# Patient Record
Sex: MALE | Race: WHITE
[De-identification: names, ages, dates, MRNs, and addresses within clinical notes are randomized per-mention and may not be internally consistent; named-entity substitution may affect disease eponyms.]

---

## 2017-03-31 ENCOUNTER — HOSPITAL ENCOUNTER
Dept: HOSPITAL 49 - FER | Age: 82
LOS: 1 days | Discharge: HOME | End: 2017-04-01
Admitting: HOSPITALIST
Payer: MEDICARE

## 2017-03-31 DIAGNOSIS — I12.9: ICD-10-CM

## 2017-03-31 DIAGNOSIS — J84.10: ICD-10-CM

## 2017-03-31 DIAGNOSIS — Z79.899: ICD-10-CM

## 2017-03-31 DIAGNOSIS — K21.9: ICD-10-CM

## 2017-03-31 DIAGNOSIS — N18.9: ICD-10-CM

## 2017-03-31 DIAGNOSIS — E78.5: ICD-10-CM

## 2017-03-31 DIAGNOSIS — R07.9: Primary | ICD-10-CM

## 2017-03-31 DIAGNOSIS — Z87.891: ICD-10-CM

## 2017-03-31 DIAGNOSIS — Z79.82: ICD-10-CM

## 2017-03-31 DIAGNOSIS — Z98.42: ICD-10-CM

## 2017-03-31 DIAGNOSIS — Z98.41: ICD-10-CM

## 2017-03-31 DIAGNOSIS — Z98.890: ICD-10-CM

## 2017-03-31 DIAGNOSIS — I25.10: ICD-10-CM

## 2017-03-31 LAB
ALBUMIN SERPL-MCNC: 4.3 G/DL (ref 3.4–4.8)
ALKALINE PHOSHATASE: 67 U/L (ref 59–141)
ALT SERPL-CCNC: 10 U/L (ref 2–40)
AST: 16 U/L (ref 0–37)
BASOPHIL: 0.2 % (ref 0–2)
BILIRUBIN - TOTAL: 0.5 MG/DL (ref 0.1–1)
BUN SERPL-MCNC: 22 MG/DL (ref 6–25)
BUN/CREAT RATIO (CALC): 12 (ref 12.1–20.1)
CHLORIDE: 97 MMOL/L (ref 98–107)
CK MB SERPL-MCNC: 1 NG/ML (ref 0.97–4.94)
CK MB SERPL-MCNC: 1 NG/ML (ref 0.97–4.94)
CK MB SERPL-MCNC: 1.03 NG/ML (ref 0.97–4.94)
CK SERPL-CCNC: 77 U/L (ref 38–174)
CO2 (BICARBONATE): 27 MMOL/L (ref 23–33)
CREATININE: 1.8 MG/DL (ref 0.7–1.2)
D DIMER PPP FEU-MCNC: 3.4 UG/MLFEU (ref 0–0.41)
EOSINOPHIL: 2.4 % (ref 0–7)
GLOBULIN (CALCULATION): 3.5 G/DL (ref 2.2–4.2)
GLUCOSE SERPL-MCNC: 110 MG/DL (ref 83–110)
HCT: 38.1 % (ref 42–52)
HGB BLD-MCNC: 12.7 G/DL (ref 13.2–18)
INR PPP: 1.03 (ref 0.9–1.2)
LIPASE: 51 U/L (ref 13–60)
LYMPHOCYTE: 24.4 % (ref 15–48)
MAGNESIUM SERPL-MCNC: 1.91 MG/DL (ref 1.4–2.1)
MCH RBC QN AUTO: 30.6 PG (ref 25–31)
MCHC RBC AUTO-ENTMCNC: 33.3 G/DL (ref 32–36)
MCV: 91.8 FL (ref 78–100)
MONOCYTE: 11.7 % (ref 0–12)
MPV: 8.8 FL (ref 6–9.5)
MYOGLOBIN: 44 NG/ML (ref 26–65)
NEUTROPHIL: 61.3 % (ref 41–80)
PLT: 258 K/UL (ref 150–400)
POTASSIUM: 4.1 MMOL/L (ref 3.5–5.1)
PRO-BNP: 383 PG/ML (ref 0–450)
PROTHROMBIN TIME: 13.1 SECONDS (ref 11.7–14)
PTT: 29.6 SECONDS (ref 23.2–31.4)
RBC MORPHOLOGY: NORMAL
RBC: 4.15 M/UL (ref 4.7–6)
RDW: 13.7 % (ref 11.5–14)
TOTAL PROTEIN: 7.8 G/DL (ref 6.4–8.3)
TROPONIN T: < 0.01 NG/ML
WBC: 9.9 K/UL (ref 4–10.5)

## 2017-03-31 PROCEDURE — A9539 TC99M PENTETATE: HCPCS

## 2017-03-31 PROCEDURE — A9540 TC99M MAA: HCPCS

## 2017-03-31 PROCEDURE — G0378 HOSPITAL OBSERVATION PER HR: HCPCS

## 2017-04-05 ENCOUNTER — HOSPITAL ENCOUNTER (EMERGENCY)
Dept: HOSPITAL 49 - FER | Age: 82
LOS: 1 days | Discharge: HOME | End: 2017-04-06
Payer: MEDICARE

## 2017-04-05 DIAGNOSIS — I11.9: ICD-10-CM

## 2017-04-05 DIAGNOSIS — I44.4: ICD-10-CM

## 2017-04-05 DIAGNOSIS — Z98.61: ICD-10-CM

## 2017-04-05 DIAGNOSIS — E78.5: ICD-10-CM

## 2017-04-05 DIAGNOSIS — Z79.899: ICD-10-CM

## 2017-04-05 DIAGNOSIS — R07.89: Primary | ICD-10-CM

## 2017-04-05 DIAGNOSIS — I25.10: ICD-10-CM

## 2017-04-05 DIAGNOSIS — K21.9: ICD-10-CM

## 2017-04-05 DIAGNOSIS — R06.02: ICD-10-CM

## 2017-04-05 LAB
ALBUMIN SERPL-MCNC: 4.2 G/DL (ref 3.4–4.8)
ALKALINE PHOSHATASE: 71 U/L (ref 59–141)
ALT SERPL-CCNC: 11 U/L (ref 2–40)
AST: 17 U/L (ref 0–37)
BASOPHIL: 0.3 % (ref 0–2)
BILIRUBIN - TOTAL: 0.4 MG/DL (ref 0.1–1)
BUN SERPL-MCNC: 26 MG/DL (ref 6–25)
BUN/CREAT RATIO (CALC): 13 (ref 12.1–20.1)
CHLORIDE: 94 MMOL/L (ref 98–107)
CK MB SERPL-MCNC: 1.53 NG/ML (ref 0.97–4.94)
CK SERPL-CCNC: 93 U/L (ref 38–174)
CO2 (BICARBONATE): 25 MMOL/L (ref 23–33)
CREATININE: 2 MG/DL (ref 0.7–1.2)
EOSINOPHIL: 1.1 % (ref 0–7)
GLOBULIN (CALCULATION): 4.3 G/DL (ref 2.2–4.2)
GLUCOSE SERPL-MCNC: 125 MG/DL (ref 83–110)
HCT: 37.2 % (ref 42–52)
HGB BLD-MCNC: 12.8 G/DL (ref 13.2–18)
INR PPP: 1.03 (ref 0.9–1.2)
LYMPHOCYTE: 26.2 % (ref 15–48)
MAGNESIUM SERPL-MCNC: 1.87 MG/DL (ref 1.4–2.1)
MCH RBC QN AUTO: 31.5 PG (ref 25–31)
MCHC RBC AUTO-ENTMCNC: 34.4 G/DL (ref 32–36)
MCV: 91.6 FL (ref 78–100)
MONOCYTE: 10.8 % (ref 0–12)
MPV: 9.1 FL (ref 6–9.5)
MYOGLOBIN: 99 NG/ML (ref 26–65)
NEUTROPHIL: 61.6 % (ref 41–80)
PLT: 284 K/UL (ref 150–400)
POTASSIUM: 4.4 MMOL/L (ref 3.5–5.1)
PRO-BNP: 330 PG/ML (ref 0–450)
PROTHROMBIN TIME: 13.1 SECONDS (ref 11.7–14)
PTT: 29.5 SECONDS (ref 23.2–31.4)
RBC MORPHOLOGY: NORMAL
RBC: 4.06 M/UL (ref 4.7–6)
RDW: 13.4 % (ref 11.5–14)
TOTAL PROTEIN: 8.5 G/DL (ref 6.4–8.3)
TROPONIN T: < 0.01 NG/ML
WBC: 12 K/UL (ref 4–10.5)

## 2017-04-06 LAB
CK MB SERPL-MCNC: 1.01 NG/ML (ref 0.97–4.94)
TROPONIN T: < 0.01 NG/ML

## 2021-10-17 ENCOUNTER — APPOINTMENT (OUTPATIENT)
Dept: CT IMAGING | Facility: HOSPITAL | Age: 86
End: 2021-10-17

## 2021-10-17 ENCOUNTER — APPOINTMENT (OUTPATIENT)
Dept: GENERAL RADIOLOGY | Facility: HOSPITAL | Age: 86
End: 2021-10-17

## 2021-10-17 ENCOUNTER — HOSPITAL ENCOUNTER (EMERGENCY)
Facility: HOSPITAL | Age: 86
Discharge: NURSING, REHAB OR LONG-TERM CARE | End: 2021-10-17
Attending: EMERGENCY MEDICINE | Admitting: EMERGENCY MEDICINE

## 2021-10-17 VITALS
BODY MASS INDEX: 26.96 KG/M2 | HEART RATE: 74 BPM | DIASTOLIC BLOOD PRESSURE: 80 MMHG | OXYGEN SATURATION: 96 % | WEIGHT: 167.77 LBS | HEIGHT: 66 IN | TEMPERATURE: 98.6 F | RESPIRATION RATE: 14 BRPM | SYSTOLIC BLOOD PRESSURE: 134 MMHG
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
## 2021-10-17 DIAGNOSIS — S01.311A LACERATION OF RIGHT EAR, INITIAL ENCOUNTER: ICD-10-CM

## 2021-10-17 DIAGNOSIS — S41.101A: ICD-10-CM

## 2021-10-17 LAB
ALBUMIN SERPL-MCNC: 3.5 G/DL (ref 3.5–5.2)
ALBUMIN/GLOB SERPL: 0.8 G/DL
ALP SERPL-CCNC: 80 U/L (ref 39–117)
ALT SERPL W P-5'-P-CCNC: 12 U/L (ref 1–41)
ANION GAP SERPL CALCULATED.3IONS-SCNC: 9.6 MMOL/L (ref 5–15)
AST SERPL-CCNC: 12 U/L (ref 1–40)
BASOPHILS # BLD AUTO: 0.04 10*3/MM3 (ref 0–0.2)
BASOPHILS NFR BLD AUTO: 0.4 % (ref 0–1.5)
BILIRUB SERPL-MCNC: 0.4 MG/DL (ref 0–1.2)
BILIRUB UR QL STRIP: NEGATIVE
BUN SERPL-MCNC: 32 MG/DL (ref 8–23)
BUN/CREAT SERPL: 16.4 (ref 7–25)
CALCIUM SPEC-SCNC: 9.5 MG/DL (ref 8.6–10.5)
CHLORIDE SERPL-SCNC: 102 MMOL/L (ref 98–107)
CLARITY UR: CLEAR
CO2 SERPL-SCNC: 23.4 MMOL/L (ref 22–29)
COLOR UR: YELLOW
CREAT SERPL-MCNC: 1.95 MG/DL (ref 0.76–1.27)
DEPRECATED RDW RBC AUTO: 43.3 FL (ref 37–54)
EOSINOPHIL # BLD AUTO: 0.15 10*3/MM3 (ref 0–0.4)
EOSINOPHIL NFR BLD AUTO: 1.3 % (ref 0.3–6.2)
ERYTHROCYTE [DISTWIDTH] IN BLOOD BY AUTOMATED COUNT: 12.5 % (ref 12.3–15.4)
GFR SERPL CREATININE-BSD FRML MDRD: 33 ML/MIN/1.73
GLOBULIN UR ELPH-MCNC: 4.3 GM/DL
GLUCOSE SERPL-MCNC: 113 MG/DL (ref 65–99)
GLUCOSE UR STRIP-MCNC: NEGATIVE MG/DL
HCT VFR BLD AUTO: 40.7 % (ref 37.5–51)
HGB BLD-MCNC: 13.6 G/DL (ref 13–17.7)
HGB UR QL STRIP.AUTO: NEGATIVE
IMM GRANULOCYTES # BLD AUTO: 0.04 10*3/MM3 (ref 0–0.05)
IMM GRANULOCYTES NFR BLD AUTO: 0.4 % (ref 0–0.5)
KETONES UR QL STRIP: NEGATIVE
LEUKOCYTE ESTERASE UR QL STRIP.AUTO: NEGATIVE
LYMPHOCYTES # BLD AUTO: 2.27 10*3/MM3 (ref 0.7–3.1)
LYMPHOCYTES NFR BLD AUTO: 20.4 % (ref 19.6–45.3)
MCH RBC QN AUTO: 31.9 PG (ref 26.6–33)
MCHC RBC AUTO-ENTMCNC: 33.4 G/DL (ref 31.5–35.7)
MCV RBC AUTO: 95.5 FL (ref 79–97)
MONOCYTES # BLD AUTO: 1.04 10*3/MM3 (ref 0.1–0.9)
MONOCYTES NFR BLD AUTO: 9.3 % (ref 5–12)
NEUTROPHILS NFR BLD AUTO: 68.2 % (ref 42.7–76)
NEUTROPHILS NFR BLD AUTO: 7.6 10*3/MM3 (ref 1.7–7)
NITRITE UR QL STRIP: NEGATIVE
NRBC BLD AUTO-RTO: 0 /100 WBC (ref 0–0.2)
PH UR STRIP.AUTO: 5.5 [PH] (ref 5–8)
PLATELET # BLD AUTO: 202 10*3/MM3 (ref 140–450)
PMV BLD AUTO: 10 FL (ref 6–12)
POTASSIUM SERPL-SCNC: 4.4 MMOL/L (ref 3.5–5.2)
PROT SERPL-MCNC: 7.8 G/DL (ref 6–8.5)
PROT UR QL STRIP: ABNORMAL
QT INTERVAL: 400 MS
RBC # BLD AUTO: 4.26 10*6/MM3 (ref 4.14–5.8)
SODIUM SERPL-SCNC: 135 MMOL/L (ref 136–145)
SP GR UR STRIP: 1.02 (ref 1–1.03)
TROPONIN T SERPL-MCNC: <0.01 NG/ML (ref 0–0.03)
UROBILINOGEN UR QL STRIP: ABNORMAL
WBC # BLD AUTO: 11.14 10*3/MM3 (ref 3.4–10.8)

## 2021-10-17 PROCEDURE — 70450 CT HEAD/BRAIN W/O DYE: CPT

## 2021-10-17 PROCEDURE — 80053 COMPREHEN METABOLIC PANEL: CPT | Performed by: NURSE PRACTITIONER

## 2021-10-17 PROCEDURE — 73060 X-RAY EXAM OF HUMERUS: CPT

## 2021-10-17 PROCEDURE — 93005 ELECTROCARDIOGRAM TRACING: CPT | Performed by: NURSE PRACTITIONER

## 2021-10-17 PROCEDURE — 99283 EMERGENCY DEPT VISIT LOW MDM: CPT

## 2021-10-17 PROCEDURE — 93010 ELECTROCARDIOGRAM REPORT: CPT | Performed by: INTERNAL MEDICINE

## 2021-10-17 PROCEDURE — 81003 URINALYSIS AUTO W/O SCOPE: CPT | Performed by: NURSE PRACTITIONER

## 2021-10-17 PROCEDURE — 72125 CT NECK SPINE W/O DYE: CPT

## 2021-10-17 PROCEDURE — 84484 ASSAY OF TROPONIN QUANT: CPT | Performed by: NURSE PRACTITIONER

## 2021-10-17 PROCEDURE — 85025 COMPLETE CBC W/AUTO DIFF WBC: CPT | Performed by: NURSE PRACTITIONER

## 2021-10-17 RX ORDER — GINSENG 100 MG
1 CAPSULE ORAL ONCE
Status: COMPLETED | OUTPATIENT
Start: 2021-10-17 | End: 2021-10-17

## 2021-10-17 RX ADMIN — Medication 1 APPLICATION: at 06:52

## 2021-10-17 NOTE — ED PROVIDER NOTES
Time: 06:16 EDT  Arrived by: EMS  Chief Complaint: Fall  History provided by: EMS and nursing home staff  History is limited by: Patient aphasic from CVA     History of Present Illness:  Patient is a 85 y.o. year old male that presents to the emergency department with history of fall per nursing home staff      History provided by:  Nursing home and EMS personnel  History limited by: Patient aphasic from CVA.  Fall  Mechanism of injury: fall    Injury location:  Head/neck  Head/neck injury location:  R ear  Incident location:  custodial  Time since incident:  30 minutes  Fall:     Fall occurred:  From a bed (Patient at the nursing home found on the floor by his bed)    Impact surface:  Carpet    Point of impact:  Unable to specify  Protective equipment: none    Suspicion of alcohol use: no    Suspicion of drug use: no    Tetanus status:  Up to date  Prior to arrival data:     Patient ambulatory at scene: no      Blood loss:  Minimal (Small laceration to right ear)    Responsiveness at scene:  Alert    Orientation at scene: Unknown.    Loss of consciousness: Unknown.      Airway interventions:  None    Airway condition since incident:  Stable    Breathing condition since incident:  Stable    Circulation condition since incident:  Stable    Mental status condition since incident:  Stable  Associated symptoms: no seizures and no vomiting    Associated symptoms comment:  Patient unable to respond due to aphasia.  Fall was not witnessed. so unsure of associated symptoms  Risk factors: CAD and kidney disease            Similar Symptoms Previously: Unknown  Recently seen: Patient is at [facility]      Patient Care Team  Primary Care Provider: Dr. Campbell, Dr. Hutchins    Past Medical History:     Not on File  Past Medical History:   Diagnosis Date   • CVA, old, aphasia      History reviewed. No pertinent surgical history.  History reviewed. No pertinent family history.    Home Medications:  Prior to  "Admission medications    Not on File        Social History:   PT  has no history on file for tobacco use, alcohol use, and drug use.    Record Review:  I have reviewed the patient's records in [facility].     Review of Systems  Review of Systems   Unable to perform ROS: Patient nonverbal (Aphasia due to CVA.)   Constitutional: Negative for fever.   Gastrointestinal: Negative for vomiting.   Skin: Positive for wound ( Right ear superficial laceration). Negative for color change.   Neurological: Positive for speech difficulty ( Chronically) and weakness ( Right side status post CVA). Negative for seizures.   Hematological: Bruises/bleeds easily.   Psychiatric/Behavioral:        Unable to assess        Physical Exam  /77   Pulse 70   Temp 98.6 °F (37 °C) (Oral)   Resp 14   Ht 167.6 cm (66\")   Wt 76.1 kg (167 lb 12.3 oz)   SpO2 97%   BMI 27.08 kg/m²     Physical Exam  Vitals and nursing note reviewed.   Constitutional:       General: He is not in acute distress.  HENT:      Right Ear: Tympanic membrane and ear canal normal.      Left Ear: Tympanic membrane and ear canal normal.      Ears:        Nose: Nose normal.      Mouth/Throat:      Mouth: Mucous membranes are moist.   Eyes:      Conjunctiva/sclera: Conjunctivae normal.      Pupils: Pupils are equal, round, and reactive to light.   Cardiovascular:      Rate and Rhythm: Regular rhythm.      Pulses: Normal pulses.   Pulmonary:      Effort: Pulmonary effort is normal.      Breath sounds: Normal breath sounds.   Abdominal:      General: Bowel sounds are normal.      Palpations: Abdomen is soft.      Tenderness: There is no abdominal tenderness.   Musculoskeletal:         General: No signs of injury.      Cervical back: No tenderness.      Right lower leg: Edema ( trace) present.      Left lower leg: Edema ( trace) present.   Skin:     Comments: Skin tear right upper arm and superficial laceration right ear   Neurological:      Mental Status: He is alert.      " "Motor: Weakness ( Right arm weakness) present.      Comments: Patient unable to cooperate with exam evaluation   Psychiatric:         Mood and Affect: Mood normal.                  ED Course  /77   Pulse 70   Temp 98.6 °F (37 °C) (Oral)   Resp 14   Ht 167.6 cm (66\")   Wt 76.1 kg (167 lb 12.3 oz)   SpO2 97%   BMI 27.08 kg/m²   Results for orders placed or performed during the hospital encounter of 10/17/21   Comprehensive Metabolic Panel    Specimen: Blood   Result Value Ref Range    Glucose 113 (H) 65 - 99 mg/dL    BUN 32 (H) 8 - 23 mg/dL    Creatinine 1.95 (H) 0.76 - 1.27 mg/dL    Sodium 135 (L) 136 - 145 mmol/L    Potassium 4.4 3.5 - 5.2 mmol/L    Chloride 102 98 - 107 mmol/L    CO2 23.4 22.0 - 29.0 mmol/L    Calcium 9.5 8.6 - 10.5 mg/dL    Total Protein 7.8 6.0 - 8.5 g/dL    Albumin 3.50 3.50 - 5.20 g/dL    ALT (SGPT) 12 1 - 41 U/L    AST (SGOT) 12 1 - 40 U/L    Alkaline Phosphatase 80 39 - 117 U/L    Total Bilirubin 0.4 0.0 - 1.2 mg/dL    eGFR Non African Amer 33 (L) >60 mL/min/1.73    Globulin 4.3 gm/dL    A/G Ratio 0.8 g/dL    BUN/Creatinine Ratio 16.4 7.0 - 25.0    Anion Gap 9.6 5.0 - 15.0 mmol/L   Urinalysis With Microscopic If Indicated (No Culture) - Urine, Clean Catch    Specimen: Urine, Clean Catch   Result Value Ref Range    Color, UA Yellow Yellow, Straw    Appearance, UA Clear Clear    pH, UA 5.5 5.0 - 8.0    Specific Gravity, UA 1.016 1.005 - 1.030    Glucose, UA Negative Negative    Ketones, UA Negative Negative    Bilirubin, UA Negative Negative    Blood, UA Negative Negative    Protein, UA Trace (A) Negative    Leuk Esterase, UA Negative Negative    Nitrite, UA Negative Negative    Urobilinogen, UA 0.2 E.U./dL 0.2 - 1.0 E.U./dL   Troponin    Specimen: Blood   Result Value Ref Range    Troponin T <0.010 0.000 - 0.030 ng/mL   CBC Auto Differential    Specimen: Arm, Right; Blood   Result Value Ref Range    WBC 11.14 (H) 3.40 - 10.80 10*3/mm3    RBC 4.26 4.14 - 5.80 10*6/mm3    Hemoglobin " 13.6 13.0 - 17.7 g/dL    Hematocrit 40.7 37.5 - 51.0 %    MCV 95.5 79.0 - 97.0 fL    MCH 31.9 26.6 - 33.0 pg    MCHC 33.4 31.5 - 35.7 g/dL    RDW 12.5 12.3 - 15.4 %    RDW-SD 43.3 37.0 - 54.0 fl    MPV 10.0 6.0 - 12.0 fL    Platelets 202 140 - 450 10*3/mm3    Neutrophil % 68.2 42.7 - 76.0 %    Lymphocyte % 20.4 19.6 - 45.3 %    Monocyte % 9.3 5.0 - 12.0 %    Eosinophil % 1.3 0.3 - 6.2 %    Basophil % 0.4 0.0 - 1.5 %    Immature Grans % 0.4 0.0 - 0.5 %    Neutrophils, Absolute 7.60 (H) 1.70 - 7.00 10*3/mm3    Lymphocytes, Absolute 2.27 0.70 - 3.10 10*3/mm3    Monocytes, Absolute 1.04 (H) 0.10 - 0.90 10*3/mm3    Eosinophils, Absolute 0.15 0.00 - 0.40 10*3/mm3    Basophils, Absolute 0.04 0.00 - 0.20 10*3/mm3    Immature Grans, Absolute 0.04 0.00 - 0.05 10*3/mm3    nRBC 0.0 0.0 - 0.2 /100 WBC   ECG 12 Lead   Result Value Ref Range    QT Interval 400 ms     Medications   bacitracin 500 UNIT/GM ointment 1 application (has no administration in time range)     XR Humerus Right    Result Date: 10/17/2021  Narrative: PROCEDURE: XR HUMERUS RIGHT  COMPARISON: None  INDICATIONS: FALL. RIGHT UPPER ARM PAIN  FINDINGS:  Glenohumeral and AC joint arthrosis.  No fracture or dislocation.  CONCLUSION: No acute findings      TICO SIMENTAL MD       Electronically Signed and Approved By: TICO SIMENTAL MD on 10/17/2021 at 5:01             CT Head Without Contrast    Result Date: 10/17/2021  Narrative: PROCEDURE: CT HEAD WO CONTRAST  COMPARISON:  None INDICATIONS: CONFUSION AFTER FALL TODAY  PROTOCOL:   Standard imaging protocol performed    RADIATION:      MA and/or KV was adjusted to minimize radiation dose.     TECHNIQUE: After obtaining the patient's consent, CT images were obtained without non-ionic intravenous contrast material.  FINDINGS:  No acute intracranial hemorrhage.  No midline shift, extra-axial fluid collection, or hydrocephalus.  No depressed calvarial fracture.  Paranasal sinuses and the mastoid air cells are clear.   CONCLUSION: No acute intracranial finding     TICO SIMENTAL MD       Electronically Signed and Approved By: TICO SIMENTAL MD on 10/17/2021 at 5:31             CT Cervical Spine Without Contrast    Result Date: 10/17/2021  Narrative: PROCEDURE: CT CERVICAL SPINE WO CONTRAST  COMPARISON: None  INDICATIONS: NECK PAIN AFTER FALL TODAY  PROTOCOL:   Standard imaging protocol performed    RADIATION:      MA and/or KV was adjusted to minimize radiation dose.     TECHNIQUE: After obtaining the patient's consent, multi-planar CT images were created without contrast material.   FINDINGS:  Cervical bodies have normal height.  Minimal anterolisthesis C4 on C5.  Degenerative change, greatest at C5-6.  No fracture.  No critical central canal narrowing.  Subpleural scarring versus interstitial lung disease.  CONCLUSION: No acute findings.  Other findings as above.     TICO SIMENTAL MD       Electronically Signed and Approved By: TICO SIMENTAL MD on 10/17/2021 at 5:34               Procedures/EKGs:  Laceration Repair    Date/Time: 10/17/2021 5:38 AM  Performed by: Casandra Sung APRN  Authorized by: Kane Sims DO     Consent:     Consent obtained:  Emergent situation    Risks discussed:  Pain, poor cosmetic result and infection    Alternatives discussed:  No treatment  Anesthesia (see MAR for exact dosages):     Anesthesia method:  None  Laceration details:     Location:  Ear    Ear location:  R ear    Length (cm):  1    Depth (mm):  0.5  Repair type:     Repair type:  Simple  Exploration:     Wound exploration: entire depth of wound probed and visualized      Contaminated: no    Treatment:     Area cleansed with:  Betadine and saline    Amount of cleaning:  Standard    Irrigation solution:  Sterile saline    Irrigation volume:  50  Skin repair:     Repair method:  Tissue adhesive  Approximation:     Approximation:  Close  Post-procedure details:     Dressing:  Open (no dressing)    Patient tolerance of procedure:  Tolerated well, no  immediate complications        EKG:    Narrative & Impression    HEART RATE= 70  bpm  RR Interval= 861  ms  MI Interval= 177  ms  P Horizontal Axis= -60  deg  P Front Axis=   deg  QRSD Interval= 97  ms  QT Interval= 400  ms  QRS Axis= -49  deg  T Wave Axis= 31  deg  - ABNORMAL ECG -  Atrial-paced complexes  Left anterior fascicular block         Medical Decision Making:                     MDM  Number of Diagnoses or Management Options  Avulsion of skin of right upper extremity, initial encounter  Fall, initial encounter  Laceration of right ear, initial encounter  Diagnosis management comments: Patient has been seen and evaluated in the emergency department for recent fall.  Patient did not complain of or show signs of any pain with physical exam.  Areas that did have physical visible injury were imaged.  Patient was found beside bed and no known cause so lab work was obtained and is normal.  Will discharge back to the nursing facility and staff will be advised to consult physician as needed or return to emergency department       Amount and/or Complexity of Data Reviewed  Clinical lab tests: reviewed and ordered  Tests in the radiology section of CPT®: reviewed and ordered  Tests in the medicine section of CPT®: reviewed and ordered  Decide to obtain previous medical records or to obtain history from someone other than the patient: yes (Nursing home chart and previous lab work)  Independent visualization of images, tracings, or specimens: yes (EKG)    Risk of Complications, Morbidity, and/or Mortality  Presenting problems: moderate  Diagnostic procedures: moderate  Management options: low    Patient Progress  Patient progress: stable       Final diagnoses:   Fall, initial encounter   Avulsion of skin of right upper extremity, initial encounter   Laceration of right ear, initial encounter        Disposition:  ED Disposition     ED Disposition Condition Comment    Discharge Stable            Casandra Sung  APRN  10/17/21 0616

## 2021-10-17 NOTE — DISCHARGE INSTRUCTIONS
Keep skin tear clean and dry.  May wash with soap and water and apply bacitracin daily  Tylenol as needed for pain  All imaging was negative today and lab work was unremarkable

## 2021-10-20 ENCOUNTER — APPOINTMENT (OUTPATIENT)
Dept: GENERAL RADIOLOGY | Facility: HOSPITAL | Age: 86
End: 2021-10-20

## 2021-10-20 ENCOUNTER — HOSPITAL ENCOUNTER (EMERGENCY)
Facility: HOSPITAL | Age: 86
Discharge: NURSING, REHAB OR LONG-TERM CARE | End: 2021-10-21
Attending: EMERGENCY MEDICINE | Admitting: EMERGENCY MEDICINE

## 2021-10-20 VITALS
SYSTOLIC BLOOD PRESSURE: 128 MMHG | HEIGHT: 68 IN | TEMPERATURE: 97.7 F | RESPIRATION RATE: 18 BRPM | BODY MASS INDEX: 24.25 KG/M2 | WEIGHT: 160 LBS | DIASTOLIC BLOOD PRESSURE: 87 MMHG | OXYGEN SATURATION: 96 % | HEART RATE: 88 BPM

## 2021-10-20 DIAGNOSIS — I47.1 SVT (SUPRAVENTRICULAR TACHYCARDIA) (HCC): Primary | ICD-10-CM

## 2021-10-20 LAB
ALBUMIN SERPL-MCNC: 3.5 G/DL (ref 3.5–5.2)
ALBUMIN/GLOB SERPL: 0.8 G/DL
ALP SERPL-CCNC: 92 U/L (ref 39–117)
ALT SERPL W P-5'-P-CCNC: 22 U/L (ref 1–41)
ANION GAP SERPL CALCULATED.3IONS-SCNC: 13.4 MMOL/L (ref 5–15)
AST SERPL-CCNC: 29 U/L (ref 1–40)
BASOPHILS # BLD AUTO: 0.06 10*3/MM3 (ref 0–0.2)
BASOPHILS NFR BLD AUTO: 0.5 % (ref 0–1.5)
BILIRUB SERPL-MCNC: 0.3 MG/DL (ref 0–1.2)
BUN SERPL-MCNC: 39 MG/DL (ref 8–23)
BUN/CREAT SERPL: 18 (ref 7–25)
CALCIUM SPEC-SCNC: 9.3 MG/DL (ref 8.6–10.5)
CHLORIDE SERPL-SCNC: 101 MMOL/L (ref 98–107)
CO2 SERPL-SCNC: 21.6 MMOL/L (ref 22–29)
CREAT SERPL-MCNC: 2.17 MG/DL (ref 0.76–1.27)
DEPRECATED RDW RBC AUTO: 43.1 FL (ref 37–54)
EOSINOPHIL # BLD AUTO: 0.22 10*3/MM3 (ref 0–0.4)
EOSINOPHIL NFR BLD AUTO: 1.7 % (ref 0.3–6.2)
ERYTHROCYTE [DISTWIDTH] IN BLOOD BY AUTOMATED COUNT: 12.5 % (ref 12.3–15.4)
GFR SERPL CREATININE-BSD FRML MDRD: 29 ML/MIN/1.73
GLOBULIN UR ELPH-MCNC: 4.3 GM/DL
GLUCOSE SERPL-MCNC: 132 MG/DL (ref 65–99)
HCT VFR BLD AUTO: 39.5 % (ref 37.5–51)
HGB BLD-MCNC: 13.6 G/DL (ref 13–17.7)
HOLD SPECIMEN: NORMAL
HOLD SPECIMEN: NORMAL
IMM GRANULOCYTES # BLD AUTO: 0.04 10*3/MM3 (ref 0–0.05)
IMM GRANULOCYTES NFR BLD AUTO: 0.3 % (ref 0–0.5)
LYMPHOCYTES # BLD AUTO: 4.88 10*3/MM3 (ref 0.7–3.1)
LYMPHOCYTES NFR BLD AUTO: 36.9 % (ref 19.6–45.3)
MAGNESIUM SERPL-MCNC: 1.7 MG/DL (ref 1.6–2.4)
MCH RBC QN AUTO: 32.4 PG (ref 26.6–33)
MCHC RBC AUTO-ENTMCNC: 34.4 G/DL (ref 31.5–35.7)
MCV RBC AUTO: 94 FL (ref 79–97)
MONOCYTES # BLD AUTO: 1.03 10*3/MM3 (ref 0.1–0.9)
MONOCYTES NFR BLD AUTO: 7.8 % (ref 5–12)
NEUTROPHILS NFR BLD AUTO: 52.8 % (ref 42.7–76)
NEUTROPHILS NFR BLD AUTO: 6.98 10*3/MM3 (ref 1.7–7)
NRBC BLD AUTO-RTO: 0 /100 WBC (ref 0–0.2)
PLATELET # BLD AUTO: 259 10*3/MM3 (ref 140–450)
PMV BLD AUTO: 10 FL (ref 6–12)
POTASSIUM SERPL-SCNC: 4.4 MMOL/L (ref 3.5–5.2)
PROT SERPL-MCNC: 7.8 G/DL (ref 6–8.5)
RBC # BLD AUTO: 4.2 10*6/MM3 (ref 4.14–5.8)
SODIUM SERPL-SCNC: 136 MMOL/L (ref 136–145)
TROPONIN T SERPL-MCNC: <0.01 NG/ML (ref 0–0.03)
WBC # BLD AUTO: 13.21 10*3/MM3 (ref 3.4–10.8)
WHOLE BLOOD HOLD SPECIMEN: NORMAL
WHOLE BLOOD HOLD SPECIMEN: NORMAL

## 2021-10-20 PROCEDURE — 80053 COMPREHEN METABOLIC PANEL: CPT | Performed by: EMERGENCY MEDICINE

## 2021-10-20 PROCEDURE — 83735 ASSAY OF MAGNESIUM: CPT | Performed by: EMERGENCY MEDICINE

## 2021-10-20 PROCEDURE — 93005 ELECTROCARDIOGRAM TRACING: CPT | Performed by: EMERGENCY MEDICINE

## 2021-10-20 PROCEDURE — 71045 X-RAY EXAM CHEST 1 VIEW: CPT

## 2021-10-20 PROCEDURE — 25010000002 ADENOSINE PER 6 MG

## 2021-10-20 PROCEDURE — 96374 THER/PROPH/DIAG INJ IV PUSH: CPT

## 2021-10-20 PROCEDURE — 99283 EMERGENCY DEPT VISIT LOW MDM: CPT

## 2021-10-20 PROCEDURE — 85025 COMPLETE CBC W/AUTO DIFF WBC: CPT | Performed by: EMERGENCY MEDICINE

## 2021-10-20 PROCEDURE — 84484 ASSAY OF TROPONIN QUANT: CPT | Performed by: EMERGENCY MEDICINE

## 2021-10-20 RX ORDER — CHOLECALCIFEROL (VITAMIN D3) 125 MCG
500 CAPSULE ORAL DAILY
COMMUNITY

## 2021-10-20 RX ORDER — ADENOSINE 3 MG/ML
INJECTION, SOLUTION INTRAVENOUS
Status: COMPLETED
Start: 2021-10-20 | End: 2021-10-20

## 2021-10-20 RX ORDER — PANTOPRAZOLE SODIUM 40 MG/1
40 TABLET, DELAYED RELEASE ORAL DAILY
COMMUNITY
End: 2021-11-02

## 2021-10-20 RX ORDER — FERROUS SULFATE 325(65) MG
325 TABLET ORAL
COMMUNITY

## 2021-10-20 RX ORDER — ATORVASTATIN CALCIUM 40 MG/1
40 TABLET, FILM COATED ORAL NIGHTLY
COMMUNITY

## 2021-10-20 RX ORDER — METOPROLOL SUCCINATE 25 MG/1
50 TABLET, EXTENDED RELEASE ORAL 2 TIMES DAILY
COMMUNITY

## 2021-10-20 RX ORDER — SODIUM CHLORIDE 0.9 % (FLUSH) 0.9 %
10 SYRINGE (ML) INJECTION AS NEEDED
Status: DISCONTINUED | OUTPATIENT
Start: 2021-10-20 | End: 2021-10-21 | Stop reason: HOSPADM

## 2021-10-20 RX ADMIN — ADENOSINE 6 MG: 3 INJECTION INTRAVENOUS at 21:24

## 2021-10-21 NOTE — ED NOTES
"Per MD request, contacted [facility] and spoke to Olga (primary caregiver this evening). She stated, \"baseline is confusion, alert to self and situation. Having trouble speaking is not normal for him. History of A-fib.\"     Fely Bravo RN  10/20/21 2507    "

## 2021-10-21 NOTE — ED PROVIDER NOTES
"Time: 9:11 PM EDT  Arrived by: ambulance  Chief Complaint:   Chief Complaint   Patient presents with   • Rapid Heart Rate     History provided by: EMS and nursing home  History is limited by: AMS    History of Present Illness:  Patient is a 85 y.o. year old male that presents to the emergency department with RAPID HEART RATE.    Per EMS, the pt was at [facility] nursing home when they called due to SVT. Pt denies any pain.  Pt had a fall three days ago. Pt has hx of a stroke with right sided hemiparesis and speech difficulty.   Per the nursing home, the pt is currently at his baseline and only oriented to self and situation.       History provided by:  EMS personnel and nursing home  History limited by:  Mental status change   used: No        Similar Symptoms Previously: none  Recently seen: recently seen in this ED (10/17/2021)    Patient Care Team  Primary Care Provider: Cole Johnson MD    Past Medical History:     No Known Allergies  Past Medical History:   Diagnosis Date   • CVA, old, aphasia    • Stroke (HCC)      History reviewed. No pertinent surgical history.  History reviewed. No pertinent family history.    Home Medications:  Prior to Admission medications    Not on File        Social History:   Social History     Tobacco Use   • Smoking status: Former Smoker   • Smokeless tobacco: Not on file   Substance Use Topics   • Alcohol use: Not Currently   • Drug use: Never     Recent travel: not applicable     Review of Systems:  Review of Systems   Unable to perform ROS: Mental status change        Physical Exam:  /87   Pulse 81   Temp 97.7 °F (36.5 °C) (Oral)   Resp 18   Ht 172.7 cm (68\")   Wt 72.6 kg (160 lb)   SpO2 96%   BMI 24.33 kg/m²     Physical Exam  Vitals and nursing note reviewed.   Constitutional:       Appearance: Normal appearance. He is well-developed.   HENT:      Head: Normocephalic and atraumatic.      Right Ear: External ear normal.      Left Ear: External " ear normal.      Nose: Nose normal.      Mouth/Throat:      Mouth: Mucous membranes are moist.      Pharynx: Oropharynx is clear.   Eyes:      General: Lids are normal.      Extraocular Movements: Extraocular movements intact.      Conjunctiva/sclera: Conjunctivae normal.      Pupils: Pupils are equal, round, and reactive to light.   Cardiovascular:      Rate and Rhythm: Regular rhythm. Tachycardia present.      Pulses: Normal pulses.      Heart sounds: Normal heart sounds. No murmur heard.      Pulmonary:      Effort: Pulmonary effort is normal.      Breath sounds: Normal breath sounds. No stridor. No wheezing.   Abdominal:      Palpations: Abdomen is soft.      Tenderness: There is no abdominal tenderness.   Musculoskeletal:         General: Normal range of motion.      Cervical back: Full passive range of motion without pain, normal range of motion and neck supple.      Right lower leg: No edema.      Left lower leg: No edema.   Skin:     General: Skin is warm and dry.      Capillary Refill: Capillary refill takes less than 2 seconds.   Neurological:      General: No focal deficit present.      Mental Status: He is alert. He is confused.      Motor: Weakness present.      Comments: Pt is minimally verbal and can only answer yes/no questions. Pt has weakness of the right upper and lower extremities. Pt is confused.                 Medications in the Emergency Department:  Medications   sodium chloride 0.9 % flush 10 mL (has no administration in time range)   adenosine (ADENOCARD) 6 MG/2ML injection  - ADS Override Pull (6 mg  Given 10/20/21 2124)        Labs  Lab Results (last 24 hours)     Procedure Component Value Units Date/Time    CBC & Differential [[id number]]  (Abnormal) Collected: 10/20/21 2112    Specimen: Blood Updated: 10/20/21 2118    Narrative:      The following orders were created for panel order CBC & Differential.  Procedure                               Abnormality         Status                      ---------                               -----------         ------                     CBC Auto Differential[[id number]]        Abnormal            Final result                 Please view results for these tests on the individual orders.    Comprehensive Metabolic Panel [[id number]]  (Abnormal) Collected: 10/20/21 2112    Specimen: Blood Updated: 10/20/21 2152     Glucose 132 mg/dL      BUN 39 mg/dL      Creatinine 2.17 mg/dL      Sodium 136 mmol/L      Potassium 4.4 mmol/L      Comment: Slight hemolysis detected by analyzer. Results may be affected.        Chloride 101 mmol/L      CO2 21.6 mmol/L      Calcium 9.3 mg/dL      Total Protein 7.8 g/dL      Albumin 3.50 g/dL      ALT (SGPT) 22 U/L      AST (SGOT) 29 U/L      Comment: Slight hemolysis detected by analyzer. Results may be affected.        Alkaline Phosphatase 92 U/L      Total Bilirubin 0.3 mg/dL      eGFR Non African Amer 29 mL/min/1.73      Globulin 4.3 gm/dL      A/G Ratio 0.8 g/dL      BUN/Creatinine Ratio 18.0     Anion Gap 13.4 mmol/L     Narrative:      GFR Normal >60  Chronic Kidney Disease <60  Kidney Failure <15      Magnesium [[id number]]  (Normal) Collected: 10/20/21 2112    Specimen: Blood Updated: 10/20/21 2138     Magnesium 1.7 mg/dL     Troponin [[id number]]  (Normal) Collected: 10/20/21 2112    Specimen: Blood Updated: 10/20/21 2136     Troponin T <0.010 ng/mL     Narrative:      Troponin T Reference Range:  <= 0.03 ng/mL-   Negative for AMI  >0.03 ng/mL-     Abnormal for myocardial necrosis.  Clinicians would have to utilize clinical acumen, EKG, Troponin and serial changes to determine if it is an Acute Myocardial Infarction or myocardial injury due to an underlying chronic condition.       Results may be falsely decreased if patient taking Biotin.      CBC Auto Differential [[id number]]  (Abnormal) Collected: 10/20/21 2112    Specimen: Blood Updated: 10/20/21 2118     WBC 13.21 10*3/mm3      RBC 4.20 10*6/mm3      Hemoglobin 13.6 g/dL       Hematocrit 39.5 %      MCV 94.0 fL      MCH 32.4 pg      MCHC 34.4 g/dL      RDW 12.5 %      RDW-SD 43.1 fl      MPV 10.0 fL      Platelets 259 10*3/mm3      Neutrophil % 52.8 %      Lymphocyte % 36.9 %      Monocyte % 7.8 %      Eosinophil % 1.7 %      Basophil % 0.5 %      Immature Grans % 0.3 %      Neutrophils, Absolute 6.98 10*3/mm3      Lymphocytes, Absolute 4.88 10*3/mm3      Monocytes, Absolute 1.03 10*3/mm3      Eosinophils, Absolute 0.22 10*3/mm3      Basophils, Absolute 0.06 10*3/mm3      Immature Grans, Absolute 0.04 10*3/mm3      nRBC 0.0 /100 WBC            Imaging:  XR Chest 1 View    Result Date: 10/20/2021  PROCEDURE: XR CHEST 1 VW  COMPARISON: None  INDICATIONS: Dysrhythmia Triage Protocol/rapid heart rate  FINDINGS:  Mild cardiomegaly.  Dual lead pacer.  Interstitial and alveolar opacity in both lungs.  No pneumothorax.  CONCLUSION: Pulmonary opacities could represent atypical pneumonia, less likely edema.  Correlate with presentation.       TICO SIMENTAL MD       Electronically Signed and Approved By: TICO SIMENTAL MD on 10/20/2021 at 21:25               Procedures:  Chemical Cardioversion    Date/Time: 10/20/2021 11:23 PM  Performed by: Celestino Langford MD  Authorized by: Celestino Langford MD     Consent:     Consent obtained:  Emergent situation    Alternatives discussed:  No treatment  Pre-procedure details:     Cardioversion basis:  Emergent    Rhythm:  Supraventricular tachycardia  Attempt one:     Cardioversion medication:  Adenosine 6mg      Medication outcome:  Conversion to normal sinus rhythm  Post-procedure details:     Patient status:  Awake    Patient tolerance of procedure:  Tolerated well, no immediate complications        Progress  ED Course as of 10/20/21 2325   Wed Oct 20, 2021   2115 ECG 12 Lead  SVT with a rate of 170.  Nonspecific T wave abnormalities.  Narrow QRS.  .  EKG changed when compared to previous.  EKG interpreted by me. [LD]   2125 ECG 12 Lead  Sinus  rhythm with rate of 94.  Narrow QRS.  Left anterior fascicular block.  QTc 410.  VA interval within normal range.  EKG change when compared to previous.  EKG interpreted by me. [LD]   2223 RDW-SD: 43.1 [LD]      ED Course User Index  [LD] Celestino Langford MD                            Medical Decision Making:  MDM  Number of Diagnoses or Management Options  Diagnosis management comments: Patient is afebrile nontoxic-appearing.  Vital signs are remarkable for an elevated heart rate.  EKG showed SVT.  Patient was given adenosine and converted back to sinus rhythm.  Patient is confused but according to nursing facility this is baseline for him.  He recently a stroke and has right-sided hemiparesis.  Patient has no complaints at this time.  Labs were obtained and showed an elevated creatinine of 2.17 this is similar to previous.  No other significant lab abnormalities.  Patient was monitored for several hours.  He remained stable.  At this time patient is stable for discharge back to nursing facility.  Discussed treatment plan with patient.  Recommend close follow-up with his primary physician.       Amount and/or Complexity of Data Reviewed  Clinical lab tests: reviewed and ordered  Tests in the medicine section of CPT®: ordered and reviewed  Review and summarize past medical records: yes  Independent visualization of images, tracings, or specimens: yes    Risk of Complications, Morbidity, and/or Mortality  Presenting problems: moderate  Diagnostic procedures: moderate  Management options: moderate    Critical Care  Total time providing critical care: 30-74 minutes (I spent 32 minutes providing critical care exclusive of procedures.   Time includes: direct patient care, patient reassessment, coordination of patient care, interpretation of data (laboratory data and imaging), review of patient's medical records, medical consultation, family consultation regarding treatment decisions and documentation of patient  care.)       Final diagnoses:   SVT (supraventricular tachycardia) (HCC)        Disposition:  ED Disposition     ED Disposition Condition Comment    Discharge Stable           This medical record created using voice recognition software and may contain unintended errors.    Documentation assistance provided by Delfina Tyler acting as scribe for Celestino Langford MD. Information recorded by the scribe was done at my direction and has been verified and validated by me.          Delfina Tyler  10/20/21 2131       Delfina Tyler  10/20/21 2142       Celestino Langford MD  10/20/21 2321       Celestino Langford MD  10/20/21 2324

## 2021-10-22 LAB — QT INTERVAL: 327 MS

## 2021-10-25 LAB — QT INTERVAL: 277 MS

## 2021-10-27 ENCOUNTER — TRANSCRIBE ORDERS (OUTPATIENT)
Dept: VASCULAR SURGERY | Facility: HOSPITAL | Age: 86
End: 2021-10-27

## 2021-10-27 DIAGNOSIS — I65.23 BILATERAL CAROTID ARTERY STENOSIS: Primary | ICD-10-CM

## 2021-10-28 ENCOUNTER — OFFICE VISIT (OUTPATIENT)
Dept: VASCULAR SURGERY | Facility: HOSPITAL | Age: 86
End: 2021-10-28

## 2021-10-28 VITALS
DIASTOLIC BLOOD PRESSURE: 74 MMHG | OXYGEN SATURATION: 98 % | SYSTOLIC BLOOD PRESSURE: 110 MMHG | HEART RATE: 77 BPM | TEMPERATURE: 98.3 F | RESPIRATION RATE: 18 BRPM

## 2021-10-28 DIAGNOSIS — I63.20 CEREBROVASCULAR ACCIDENT (CVA) DUE TO STENOSIS OF PRECEREBRAL ARTERY (HCC): ICD-10-CM

## 2021-10-28 DIAGNOSIS — I65.22 STENOSIS OF LEFT CAROTID ARTERY: Primary | ICD-10-CM

## 2021-10-28 PROCEDURE — G0463 HOSPITAL OUTPT CLINIC VISIT: HCPCS

## 2021-10-28 PROCEDURE — G0463 HOSPITAL OUTPT CLINIC VISIT: HCPCS | Performed by: SURGERY

## 2021-10-28 PROCEDURE — 99204 OFFICE O/P NEW MOD 45 MIN: CPT | Performed by: SURGERY

## 2021-10-28 NOTE — PROGRESS NOTES
[facility]   HISTORY AND PHYSICAL    Patient Name: Suhail Springer  : 1935  MRN: [id number]  Primary Care Physician:  Cole Johnson MD      Subjective   Subjective     Chief Complaint: Carotid stenosis    HPI:    Suhail Springer is a 85 y.o. male who presents with carotid stenosis.  During the first week in October he was inpatient forward with his wife on vacation.  He had a stroke and was taken to a local hospital.  There he had findings of severe left internal carotid artery stenosis.  He also had complete right arm and leg paralysis.  He was seen by the local vascular surgeon endarterectomy of the carotid artery was recommended.  He was subsequently discharged after a week in the hospital to [facility] in [place].  He is undergoing physical therapy there and is regained his function of the right leg.  His right upper arm strength is improving but his fine motor skills are still absent.  He understands but has difficulty expressing himself.  He presents for evaluation for surgery.        Personal History     Past Medical History:   Diagnosis Date   • Carotid stenosis    • CVA, old, aphasia    • Hyperlipidemia    • Stroke (HCC)        History reviewed. No pertinent surgical history.    Family History: family history is not on file. Otherwise pertinent FHx was reviewed and not pertinent to current issue.    Social History:  reports that he has quit smoking. He has never used smokeless tobacco. He reports previous alcohol use. He reports that he does not use drugs.    Home Medications:  Current Outpatient Medications on File Prior to Visit   Medication Sig   • apixaban (ELIQUIS) 5 MG tablet tablet Take 5 mg by mouth 2 (Two) Times a Day.   • atorvastatin (LIPITOR) 40 MG tablet Take 40 mg by mouth Daily.   • ferrous sulfate 325 (65 FE) MG tablet Take 325 mg by mouth Daily With Breakfast.   • metoprolol succinate XL (TOPROL-XL) 25 MG 24 hr tablet Take 25 mg by mouth Daily.   • pantoprazole  (PROTONIX) 40 MG EC tablet Take 40 mg by mouth Daily.   • vitamin B-12 (CYANOCOBALAMIN) 500 MCG tablet Take 500 mcg by mouth Daily.     No current facility-administered medications on file prior to visit.          Allergies:  No Known Allergies    Objective   Objective     Vitals:   Temp:  [98.3 °F (36.8 °C)] 98.3 °F (36.8 °C)  Heart Rate:  [77] 77  Resp:  [18] 18  BP: (110)/(74) 110/74    Physical Exam  Constitutional:       Appearance: Normal appearance.   HENT:      Head: Normocephalic and atraumatic.   Eyes:      Extraocular Movements: Extraocular movements intact.      Pupils: Pupils are equal, round, and reactive to light.   Cardiovascular:      Rate and Rhythm: Normal rate and regular rhythm.      Pulses:           Radial pulses are 3+ on the right side and 3+ on the left side.        Femoral pulses are 3+ on the right side and 3+ on the left side.  Pulmonary:      Effort: Pulmonary effort is normal.      Breath sounds: Normal breath sounds.   Abdominal:      General: Abdomen is flat. Bowel sounds are normal.      Palpations: Abdomen is soft.   Musculoskeletal:      Cervical back: Normal range of motion and neck supple.   Skin:     General: Skin is warm and dry.   Neurological:      Mental Status: He is alert and oriented to person, place, and time.      Motor: Weakness present.      Deep Tendon Reflexes: Reflexes abnormal.      Comments: Right arm and right leg weakness            Result Review    Most notable findings include: CT angiogram shows severe stenosis of left internal carotid artery    Assessment/Plan   Assessment / Plan     Brief Patient Summary:  Suhail Springer is a 85 y.o. male who has severe symptomatic left carotid stenosis    Diagnoses and all orders for this visit:    1. Stenosis of left carotid artery (Primary)    2. Cerebrovascular accident (CVA) due to stenosis of precerebral artery (HCC)         Plan:   I reviewed the CT scan.  His right arm and leg function appears to be returning but  he continues to need rehab.  He is being discharged from rehab this coming Monday.  I recommended a left carotid endarterectomy to reduce his future risk of another stroke.  The procedure was explained in detail including the risks and benefits.  I explained to the family that his combined risk of having a stroke, heart attack, or death during the the procedure should be no more than 6%.  He and his family agree and wish to proceed.  The plan will be to admit him and [facility] to [facility].    Thank you for allowing me to see your patient.        Electronically signed by Olegario Hall MD, MD, 10/28/21, 3:03 PM EDT.

## 2021-11-02 ENCOUNTER — ANESTHESIA EVENT (OUTPATIENT)
Dept: PERIOP | Facility: HOSPITAL | Age: 86
End: 2021-11-02

## 2021-11-02 RX ORDER — FLUTICASONE PROPIONATE 50 MCG
1 SPRAY, SUSPENSION (ML) NASAL DAILY PRN
COMMUNITY

## 2021-11-02 RX ORDER — OMEPRAZOLE 40 MG/1
40 CAPSULE, DELAYED RELEASE ORAL DAILY
COMMUNITY

## 2021-11-02 NOTE — PRE-PROCEDURE INSTRUCTIONS
PATIENT  INSTRUCTED TO BE:    - NPO AFTER MIDNIGHT EXCEPT CAN HAVE CLEAR LIQUIDS 2 HOURS PRIOR TO SURGERY ARRIVAL TIME     - TO HOLD ALL VITAMINS, SUPPLEMENTS, NSAIDS FOR ONE WEEK PRIOR TO THEIR SURGICAL PROCEDURE    - INSTRUCTED PT TO USE SURGICAL SOAP 1 TIME THE NIGHT PRIOR TO SURGERY OR THE AM OF SURGERY.   USE SOAP FROM NECK TO TOES AVOID THEIR FACE, HAIR, AND PRIVATE PARTS. INSTRUCTED NO LOTIONS, JEWELRY, PIERCINGS, OR DEODORANT DAY OF SURGERY    INSTRUCTED PT AND HIS WIFE TO FOLLOW DR HURST INSTRUCTIONS ON DISCONTINUING ELIQIUS (PER PT WIFE HE ALREADY STOPPED MEDS FOR SURGERY)    - INSTRUCTED TO TAKE THE FOLLOWING MEDICATIONS THE DAY OF SURGERY:       FERROUS SULFATE, FLONASE PRN, METOPROLOL, OMEPRAZOLE    PATIENT VERBALIZED UNDERSTANDING

## 2021-11-03 ENCOUNTER — APPOINTMENT (OUTPATIENT)
Dept: CT IMAGING | Facility: HOSPITAL | Age: 86
End: 2021-11-03

## 2021-11-03 ENCOUNTER — HOSPITAL ENCOUNTER (INPATIENT)
Facility: HOSPITAL | Age: 86
LOS: 2 days | Discharge: NURSING, REHAB OR LONG-TERM CARE | End: 2021-11-05
Attending: SURGERY | Admitting: SURGERY

## 2021-11-03 ENCOUNTER — ANESTHESIA (OUTPATIENT)
Dept: PERIOP | Facility: HOSPITAL | Age: 86
End: 2021-11-03

## 2021-11-03 DIAGNOSIS — Z78.9 DECREASED ACTIVITIES OF DAILY LIVING (ADL): Primary | ICD-10-CM

## 2021-11-03 DIAGNOSIS — I65.22 STENOSIS OF LEFT CAROTID ARTERY: ICD-10-CM

## 2021-11-03 DIAGNOSIS — R26.2 DIFFICULTY WALKING: ICD-10-CM

## 2021-11-03 LAB
ABO GROUP BLD: NORMAL
ABO GROUP BLD: NORMAL
BLD GP AB SCN SERPL QL: NEGATIVE
QT INTERVAL: 401 MS
RH BLD: POSITIVE
RH BLD: POSITIVE
T&S EXPIRATION DATE: NORMAL

## 2021-11-03 PROCEDURE — 93005 ELECTROCARDIOGRAM TRACING: CPT | Performed by: ANESTHESIOLOGY

## 2021-11-03 PROCEDURE — C1889 IMPLANT/INSERT DEVICE, NOC: HCPCS | Performed by: SURGERY

## 2021-11-03 PROCEDURE — 86900 BLOOD TYPING SEROLOGIC ABO: CPT

## 2021-11-03 PROCEDURE — 86901 BLOOD TYPING SEROLOGIC RH(D): CPT | Performed by: SURGERY

## 2021-11-03 PROCEDURE — 03UN0KZ SUPPLEMENT LEFT EXTERNAL CAROTID ARTERY WITH NONAUTOLOGOUS TISSUE SUBSTITUTE, OPEN APPROACH: ICD-10-PCS | Performed by: SURGERY

## 2021-11-03 PROCEDURE — 25010000002 MIDAZOLAM PER 1MG: Performed by: ANESTHESIOLOGY

## 2021-11-03 PROCEDURE — 70498 CT ANGIOGRAPHY NECK: CPT

## 2021-11-03 PROCEDURE — 35301 RECHANNELING OF ARTERY: CPT | Performed by: SURGERY

## 2021-11-03 PROCEDURE — 86901 BLOOD TYPING SEROLOGIC RH(D): CPT

## 2021-11-03 PROCEDURE — 0 IOPAMIDOL PER 1 ML: Performed by: SURGERY

## 2021-11-03 PROCEDURE — 03CL0ZZ EXTIRPATION OF MATTER FROM LEFT INTERNAL CAROTID ARTERY, OPEN APPROACH: ICD-10-PCS | Performed by: SURGERY

## 2021-11-03 PROCEDURE — 03CN0ZZ EXTIRPATION OF MATTER FROM LEFT EXTERNAL CAROTID ARTERY, OPEN APPROACH: ICD-10-PCS | Performed by: SURGERY

## 2021-11-03 PROCEDURE — 25010000002 PROPOFOL 10 MG/ML EMULSION: Performed by: NURSE ANESTHETIST, CERTIFIED REGISTERED

## 2021-11-03 PROCEDURE — C1768 GRAFT, VASCULAR: HCPCS | Performed by: SURGERY

## 2021-11-03 PROCEDURE — 70496 CT ANGIOGRAPHY HEAD: CPT

## 2021-11-03 PROCEDURE — 0 CEFAZOLIN IN DEXTROSE 2-4 GM/100ML-% SOLUTION: Performed by: SURGERY

## 2021-11-03 PROCEDURE — 25010000002 ONDANSETRON PER 1 MG: Performed by: NURSE ANESTHETIST, CERTIFIED REGISTERED

## 2021-11-03 PROCEDURE — 94799 UNLISTED PULMONARY SVC/PX: CPT

## 2021-11-03 PROCEDURE — 25010000002 FENTANYL CITRATE (PF) 50 MCG/ML SOLUTION: Performed by: NURSE ANESTHETIST, CERTIFIED REGISTERED

## 2021-11-03 PROCEDURE — 86900 BLOOD TYPING SEROLOGIC ABO: CPT | Performed by: SURGERY

## 2021-11-03 PROCEDURE — 25010000002 HEPARIN (PORCINE) PER 1000 UNITS: Performed by: SURGERY

## 2021-11-03 PROCEDURE — 86850 RBC ANTIBODY SCREEN: CPT | Performed by: SURGERY

## 2021-11-03 PROCEDURE — 25010000002 DEXAMETHASONE PER 1 MG: Performed by: NURSE ANESTHETIST, CERTIFIED REGISTERED

## 2021-11-03 PROCEDURE — 25010000002 HEPARIN (PORCINE) PER 1000 UNITS: Performed by: NURSE ANESTHETIST, CERTIFIED REGISTERED

## 2021-11-03 DEVICE — CLIP LIGAT VASC HORIZON TI SM RD 24CT: Type: IMPLANTABLE DEVICE | Site: CAROTID | Status: FUNCTIONAL

## 2021-11-03 DEVICE — ABSORBABLE HEMOSTAT (OXIDIZED REGENERATED CELLULOSE, U.S.P.)
Type: IMPLANTABLE DEVICE | Site: CAROTID | Status: FUNCTIONAL
Brand: SURGICEL

## 2021-11-03 DEVICE — HEMOST ABS SURGIFOAM SZ100 8X12 10MM: Type: IMPLANTABLE DEVICE | Site: CAROTID | Status: FUNCTIONAL

## 2021-11-03 DEVICE — PTCH VASC XENOSURE BIO 0.8X8CM: Type: IMPLANTABLE DEVICE | Site: CAROTID | Status: FUNCTIONAL

## 2021-11-03 DEVICE — CLIP LIGAT VASC HORIZON TI MD BLU 24CT: Type: IMPLANTABLE DEVICE | Site: CAROTID | Status: FUNCTIONAL

## 2021-11-03 RX ORDER — DEXAMETHASONE SODIUM PHOSPHATE 4 MG/ML
INJECTION, SOLUTION INTRA-ARTICULAR; INTRALESIONAL; INTRAMUSCULAR; INTRAVENOUS; SOFT TISSUE AS NEEDED
Status: DISCONTINUED | OUTPATIENT
Start: 2021-11-03 | End: 2021-11-03 | Stop reason: SURG

## 2021-11-03 RX ORDER — MORPHINE SULFATE 2 MG/ML
2 INJECTION, SOLUTION INTRAMUSCULAR; INTRAVENOUS EVERY 4 HOURS PRN
Status: DISCONTINUED | OUTPATIENT
Start: 2021-11-03 | End: 2021-11-05 | Stop reason: HOSPADM

## 2021-11-03 RX ORDER — ROCURONIUM BROMIDE 10 MG/ML
INJECTION, SOLUTION INTRAVENOUS AS NEEDED
Status: DISCONTINUED | OUTPATIENT
Start: 2021-11-03 | End: 2021-11-03 | Stop reason: SURG

## 2021-11-03 RX ORDER — MEPERIDINE HYDROCHLORIDE 25 MG/ML
12.5 INJECTION INTRAMUSCULAR; INTRAVENOUS; SUBCUTANEOUS
Status: CANCELLED | OUTPATIENT
Start: 2021-11-15 | End: 2021-11-16

## 2021-11-03 RX ORDER — ONDANSETRON 2 MG/ML
4 INJECTION INTRAMUSCULAR; INTRAVENOUS ONCE AS NEEDED
Status: CANCELLED | OUTPATIENT
Start: 2021-11-15

## 2021-11-03 RX ORDER — NALOXONE HCL 0.4 MG/ML
0.4 VIAL (ML) INJECTION
Status: DISCONTINUED | OUTPATIENT
Start: 2021-11-03 | End: 2021-11-05 | Stop reason: HOSPADM

## 2021-11-03 RX ORDER — ONDANSETRON 2 MG/ML
4 INJECTION INTRAMUSCULAR; INTRAVENOUS EVERY 6 HOURS PRN
Status: DISCONTINUED | OUTPATIENT
Start: 2021-11-03 | End: 2021-11-05 | Stop reason: HOSPADM

## 2021-11-03 RX ORDER — SODIUM CHLORIDE 9 MG/ML
100 INJECTION, SOLUTION INTRAVENOUS CONTINUOUS
Status: DISCONTINUED | OUTPATIENT
Start: 2021-11-03 | End: 2021-11-04

## 2021-11-03 RX ORDER — FENTANYL CITRATE 50 UG/ML
INJECTION, SOLUTION INTRAMUSCULAR; INTRAVENOUS AS NEEDED
Status: DISCONTINUED | OUTPATIENT
Start: 2021-11-03 | End: 2021-11-03 | Stop reason: SURG

## 2021-11-03 RX ORDER — ONDANSETRON 2 MG/ML
INJECTION INTRAMUSCULAR; INTRAVENOUS AS NEEDED
Status: DISCONTINUED | OUTPATIENT
Start: 2021-11-03 | End: 2021-11-03 | Stop reason: SURG

## 2021-11-03 RX ORDER — NITROGLYCERIN 20 MG/100ML
5-200 INJECTION INTRAVENOUS
Status: DISCONTINUED | OUTPATIENT
Start: 2021-11-03 | End: 2021-11-04

## 2021-11-03 RX ORDER — PHENYLEPHRINE HCL IN 0.9% NACL 1 MG/10 ML
SYRINGE (ML) INTRAVENOUS AS NEEDED
Status: DISCONTINUED | OUTPATIENT
Start: 2021-11-03 | End: 2021-11-03 | Stop reason: SURG

## 2021-11-03 RX ORDER — NITROGLYCERIN 20 MG/100ML
INJECTION INTRAVENOUS AS NEEDED
Status: DISCONTINUED | OUTPATIENT
Start: 2021-11-03 | End: 2021-11-03 | Stop reason: SURG

## 2021-11-03 RX ORDER — CEFAZOLIN SODIUM 2 G/100ML
2 INJECTION, SOLUTION INTRAVENOUS EVERY 8 HOURS
Status: COMPLETED | OUTPATIENT
Start: 2021-11-03 | End: 2021-11-03

## 2021-11-03 RX ORDER — ONDANSETRON 4 MG/1
4 TABLET, FILM COATED ORAL EVERY 6 HOURS PRN
Status: DISCONTINUED | OUTPATIENT
Start: 2021-11-03 | End: 2021-11-05 | Stop reason: HOSPADM

## 2021-11-03 RX ORDER — SODIUM CHLORIDE, SODIUM LACTATE, POTASSIUM CHLORIDE, CALCIUM CHLORIDE 600; 310; 30; 20 MG/100ML; MG/100ML; MG/100ML; MG/100ML
9 INJECTION, SOLUTION INTRAVENOUS CONTINUOUS PRN
Status: DISCONTINUED | OUTPATIENT
Start: 2021-11-03 | End: 2021-11-03 | Stop reason: HOSPADM

## 2021-11-03 RX ORDER — ACETAMINOPHEN 500 MG
1000 TABLET ORAL ONCE
Status: COMPLETED | OUTPATIENT
Start: 2021-11-03 | End: 2021-11-03

## 2021-11-03 RX ORDER — HEPARIN SODIUM 1000 [USP'U]/ML
INJECTION, SOLUTION INTRAVENOUS; SUBCUTANEOUS AS NEEDED
Status: DISCONTINUED | OUTPATIENT
Start: 2021-11-03 | End: 2021-11-03 | Stop reason: SURG

## 2021-11-03 RX ORDER — HYDROMORPHONE HYDROCHLORIDE 1 MG/ML
0.5 INJECTION, SOLUTION INTRAMUSCULAR; INTRAVENOUS; SUBCUTANEOUS
Status: CANCELLED | OUTPATIENT
Start: 2021-11-15

## 2021-11-03 RX ORDER — CEFAZOLIN SODIUM 2 G/100ML
2 INJECTION, SOLUTION INTRAVENOUS ONCE
Status: COMPLETED | OUTPATIENT
Start: 2021-11-03 | End: 2021-11-03

## 2021-11-03 RX ORDER — METOPROLOL SUCCINATE 50 MG/1
50 TABLET, EXTENDED RELEASE ORAL 2 TIMES DAILY
Status: DISCONTINUED | OUTPATIENT
Start: 2021-11-03 | End: 2021-11-05 | Stop reason: HOSPADM

## 2021-11-03 RX ORDER — PROMETHAZINE HYDROCHLORIDE 25 MG/1
25 SUPPOSITORY RECTAL ONCE AS NEEDED
Status: CANCELLED | OUTPATIENT
Start: 2021-11-15

## 2021-11-03 RX ORDER — PROPOFOL 10 MG/ML
VIAL (ML) INTRAVENOUS AS NEEDED
Status: DISCONTINUED | OUTPATIENT
Start: 2021-11-03 | End: 2021-11-03 | Stop reason: SURG

## 2021-11-03 RX ORDER — PROMETHAZINE HYDROCHLORIDE 12.5 MG/1
25 TABLET ORAL ONCE AS NEEDED
Status: CANCELLED | OUTPATIENT
Start: 2021-11-15

## 2021-11-03 RX ORDER — PANTOPRAZOLE SODIUM 40 MG/1
40 TABLET, DELAYED RELEASE ORAL EVERY MORNING
Status: DISCONTINUED | OUTPATIENT
Start: 2021-11-03 | End: 2021-11-05 | Stop reason: HOSPADM

## 2021-11-03 RX ORDER — HYDROMORPHONE HYDROCHLORIDE 1 MG/ML
0.25 INJECTION, SOLUTION INTRAMUSCULAR; INTRAVENOUS; SUBCUTANEOUS
Status: CANCELLED | OUTPATIENT
Start: 2021-11-15

## 2021-11-03 RX ORDER — OXYCODONE HYDROCHLORIDE 5 MG/1
5 TABLET ORAL
Status: CANCELLED | OUTPATIENT
Start: 2021-11-15

## 2021-11-03 RX ORDER — FERROUS SULFATE 325(65) MG
325 TABLET ORAL
Status: DISCONTINUED | OUTPATIENT
Start: 2021-11-03 | End: 2021-11-05 | Stop reason: HOSPADM

## 2021-11-03 RX ORDER — LIDOCAINE HYDROCHLORIDE 20 MG/ML
INJECTION, SOLUTION INFILTRATION; PERINEURAL AS NEEDED
Status: DISCONTINUED | OUTPATIENT
Start: 2021-11-03 | End: 2021-11-03 | Stop reason: SURG

## 2021-11-03 RX ORDER — HYDROCODONE BITARTRATE AND ACETAMINOPHEN 5; 325 MG/1; MG/1
1 TABLET ORAL EVERY 4 HOURS PRN
Status: DISCONTINUED | OUTPATIENT
Start: 2021-11-03 | End: 2021-11-05 | Stop reason: HOSPADM

## 2021-11-03 RX ORDER — MIDAZOLAM HYDROCHLORIDE 2 MG/2ML
1 INJECTION, SOLUTION INTRAMUSCULAR; INTRAVENOUS ONCE
Status: COMPLETED | OUTPATIENT
Start: 2021-11-03 | End: 2021-11-03

## 2021-11-03 RX ORDER — ATORVASTATIN CALCIUM 40 MG/1
40 TABLET, FILM COATED ORAL NIGHTLY
Status: DISCONTINUED | OUTPATIENT
Start: 2021-11-03 | End: 2021-11-05 | Stop reason: HOSPADM

## 2021-11-03 RX ADMIN — NITROGLYCERIN 20 MCG: 20 INJECTION INTRAVENOUS at 08:18

## 2021-11-03 RX ADMIN — PROPOFOL 20 MG: 10 INJECTION, EMULSION INTRAVENOUS at 09:12

## 2021-11-03 RX ADMIN — DEXAMETHASONE SODIUM PHOSPHATE 4 MG: 4 INJECTION INTRA-ARTICULAR; INTRALESIONAL; INTRAMUSCULAR; INTRAVENOUS; SOFT TISSUE at 07:47

## 2021-11-03 RX ADMIN — LIDOCAINE HYDROCHLORIDE 80 MG: 20 INJECTION, SOLUTION INFILTRATION; PERINEURAL at 07:30

## 2021-11-03 RX ADMIN — SODIUM CHLORIDE 100 ML/HR: 9 INJECTION, SOLUTION INTRAVENOUS at 15:20

## 2021-11-03 RX ADMIN — SODIUM CHLORIDE, POTASSIUM CHLORIDE, SODIUM LACTATE AND CALCIUM CHLORIDE 9 ML/HR: 600; 310; 30; 20 INJECTION, SOLUTION INTRAVENOUS at 07:19

## 2021-11-03 RX ADMIN — CEFAZOLIN SODIUM 2 G: 2 INJECTION, SOLUTION INTRAVENOUS at 07:25

## 2021-11-03 RX ADMIN — Medication 100 MCG: at 07:30

## 2021-11-03 RX ADMIN — FENTANYL CITRATE 25 MCG: 50 INJECTION, SOLUTION INTRAMUSCULAR; INTRAVENOUS at 09:16

## 2021-11-03 RX ADMIN — FENTANYL CITRATE 50 MCG: 50 INJECTION, SOLUTION INTRAMUSCULAR; INTRAVENOUS at 07:40

## 2021-11-03 RX ADMIN — NITROGLYCERIN 20 MCG: 20 INJECTION INTRAVENOUS at 08:01

## 2021-11-03 RX ADMIN — Medication 100 MCG: at 07:34

## 2021-11-03 RX ADMIN — SUGAMMADEX 200 MG: 100 INJECTION, SOLUTION INTRAVENOUS at 09:41

## 2021-11-03 RX ADMIN — ONDANSETRON 4 MG: 2 INJECTION INTRAMUSCULAR; INTRAVENOUS at 07:47

## 2021-11-03 RX ADMIN — ATORVASTATIN CALCIUM 40 MG: 40 TABLET, FILM COATED ORAL at 22:00

## 2021-11-03 RX ADMIN — NITROGLYCERIN 40 MCG: 20 INJECTION INTRAVENOUS at 08:04

## 2021-11-03 RX ADMIN — METOPROLOL SUCCINATE 50 MG: 50 TABLET, EXTENDED RELEASE ORAL at 14:16

## 2021-11-03 RX ADMIN — ROCURONIUM BROMIDE 50 MG: 10 INJECTION INTRAVENOUS at 07:30

## 2021-11-03 RX ADMIN — FENTANYL CITRATE 25 MCG: 50 INJECTION, SOLUTION INTRAMUSCULAR; INTRAVENOUS at 07:30

## 2021-11-03 RX ADMIN — SODIUM CHLORIDE, POTASSIUM CHLORIDE, SODIUM LACTATE AND CALCIUM CHLORIDE: 600; 310; 30; 20 INJECTION, SOLUTION INTRAVENOUS at 09:20

## 2021-11-03 RX ADMIN — ACETAMINOPHEN 1000 MG: 500 TABLET, FILM COATED ORAL at 07:00

## 2021-11-03 RX ADMIN — NITROGLYCERIN 40 MCG: 20 INJECTION INTRAVENOUS at 09:00

## 2021-11-03 RX ADMIN — FERROUS SULFATE TAB 325 MG (65 MG ELEMENTAL FE) 325 MG: 325 (65 FE) TAB at 14:17

## 2021-11-03 RX ADMIN — FENTANYL CITRATE 25 MCG: 50 INJECTION, SOLUTION INTRAMUSCULAR; INTRAVENOUS at 07:47

## 2021-11-03 RX ADMIN — PANTOPRAZOLE SODIUM 40 MG: 40 TABLET, DELAYED RELEASE ORAL at 14:17

## 2021-11-03 RX ADMIN — HEPARIN SODIUM 6000 UNITS: 1000 INJECTION INTRAVENOUS; SUBCUTANEOUS at 08:18

## 2021-11-03 RX ADMIN — Medication 100 MCG: at 07:32

## 2021-11-03 RX ADMIN — CEFAZOLIN SODIUM 2 G: 2 INJECTION, SOLUTION INTRAVENOUS at 15:20

## 2021-11-03 RX ADMIN — CEFAZOLIN SODIUM 2 G: 2 INJECTION, SOLUTION INTRAVENOUS at 23:23

## 2021-11-03 RX ADMIN — PROPOFOL 100 MG: 10 INJECTION, EMULSION INTRAVENOUS at 07:30

## 2021-11-03 RX ADMIN — IOPAMIDOL 100 ML: 755 INJECTION, SOLUTION INTRAVENOUS at 11:40

## 2021-11-03 RX ADMIN — MIDAZOLAM HYDROCHLORIDE 1 MG: 1 INJECTION, SOLUTION INTRAMUSCULAR; INTRAVENOUS at 07:19

## 2021-11-03 RX ADMIN — METOPROLOL SUCCINATE 50 MG: 50 TABLET, EXTENDED RELEASE ORAL at 22:00

## 2021-11-03 RX ADMIN — NITROGLYCERIN 40 MCG: 20 INJECTION INTRAVENOUS at 08:09

## 2021-11-03 NOTE — ANESTHESIA PREPROCEDURE EVALUATION
Anesthesia Evaluation     Patient summary reviewed and Nursing notes reviewed   no history of anesthetic complications:  NPO Solid Status: > 8 hours  NPO Liquid Status: > 2 hours           Airway   Mallampati: I  TM distance: >3 FB  Neck ROM: full  No difficulty expected  Dental    (+) edentulous    Pulmonary - normal exam    breath sounds clear to auscultation  (+) sleep apnea,   Cardiovascular - normal exam  Exercise tolerance: poor (<4 METS)    Beta blocker given within 24 hours of surgery  Rhythm: regular    (+) pacemaker, hypertension, dysrhythmias Atrial Fib,  carotid artery disease    ROS comment:  PT >4METS; HX AFIB-PACEMAKER INSERT (SSS), CARDS CLEARANCE. ECHO 4/17 EF 66%, MILD AS, MILD MR, TRACE TR.    Neuro/Psych  (+) CVA (weak on Right),     GI/Hepatic/Renal/Endo    (+)   renal disease CRI,     Musculoskeletal     Abdominal    Substance History - negative use     OB/GYN          Other - negative ROS                     Anesthesia Plan    ASA 3     general   (Discussed Elaine)    Anesthetic plan, all risks, benefits, and alternatives have been provided, discussed and informed consent has been obtained with: patient.

## 2021-11-03 NOTE — ADDENDUM NOTE
Addendum  created 11/03/21 1249 by Brianna Guardado DO    Attestation recorded in Intraprocedure, Intraprocedure Attestations filed

## 2021-11-03 NOTE — ANESTHESIA POSTPROCEDURE EVALUATION
Patient: Suhail Springer    Procedure Summary     Date: 11/03/21 Room / Location: [facility] OR 01 / [facility] MAIN OR    Anesthesia Start: 0725 Anesthesia Stop: 1029    Procedure: LEFT CAROTID ENDARTERECTOMY WITH SHUNT, BOVINE PATCH (Left Neck) Diagnosis:       Stenosis of left carotid artery      (Stenosis of left carotid artery [I65.22])    Surgeons: Olegario Hall MD Provider: Brianna Guardado DO    Anesthesia Type: general ASA Status: 3          Anesthesia Type: general    Vitals  Vitals Value Taken Time   BP     Temp     Pulse 70 11/03/21 1106   Resp 18 11/03/21 1048   SpO2 99 % 11/03/21 1106   Vitals shown include unvalidated device data.        Post Anesthesia Care and Evaluation    Patient location during evaluation: ICU  Patient participation: complete - patient participated  Level of consciousness: awake  Pain management: adequate  Airway patency: patent  Anesthetic complications: No anesthetic complications  PONV Status: none  Cardiovascular status: acceptable and stable  Respiratory status: acceptable  Hydration status: acceptable    Comments: An Anesthesiologist personally participated in the most demanding procedures (including induction and emergence if applicable) in the anesthesia plan, monitored the course of anesthesia administration at frequent intervals and remained physically present and available for immediate diagnosis and treatment of emergencies.

## 2021-11-03 NOTE — ANESTHESIA PROCEDURE NOTES
Arterial Line    Pre-sedation assessment completed: 11/3/2021 7:25 AM    Patient reassessed immediately prior to procedure    Patient location during procedure: OR  Start time: 11/3/2021 7:32 AM  Stop Time:11/3/2021 7:36 AM       Performed By   CRNA: Griffin Crump CRNA  Preanesthetic Checklist  Completed: patient identified, IV checked, site marked, risks and benefits discussed, surgical consent, monitors and equipment checked, pre-op evaluation and timeout performed  Arterial Line Prep   Sterile Tech: mask, sterile barriers, gloves and cap  Prep: ChloraPrep  Patient monitoring: blood pressure monitoring, continuous pulse oximetry and EKG  Arterial Line Procedure   Laterality:left  Location:  radial artery  Catheter size: 20 G   Guidance: palpation technique  Number of attempts: 1  Successful placement: yes  Post Assessment   Dressing Type: occlusive dressing applied.   Complications no  Patient Tolerance: patient tolerated the procedure well with no apparent complications

## 2021-11-03 NOTE — PLAN OF CARE
Goal Outcome Evaluation:  Had carotid today, arrived on floor at around 1100.  Patient alert to hisself, otherwise confused.  Patient has had a stroke in the past and has weakness at RUE, all other extremities, he is strong.  Patient has order for nitroglycerine gtt but we did not start it due to an acceptable B/P, MD aware.  Patient has Elaine

## 2021-11-03 NOTE — OP NOTE
CAROTID ENDARTERECTOMY  Operative Report    Patient Name:  Suhail Springer  YOB: 1935    Date of Surgery:  11/3/2021       Indications: 85-year-old male who had a left-sided stroke producing right-sided symptoms.  His right upper and lower extremity weakness was improving.  The stroke was 3 weeks ago.  A CT scan revealed severe stenosis of the left internal carotid artery.  Because of his recovery from right upper extremity hemiparesis and in order to prevent recurrent major stroke an endarterectomy was indicated.    Pre-OP Diagnosis     Stenosis of left carotid artery [I65.22], severe symptomatic left carotid stenosis      Post-OP Diagnosis     Post-Op Diagnosis Codes:     * Stenosis of left carotid artery [I65.22], severe symptomatic left carotid stenosis      Procedure         Procedure(s):  LEFT CAROTID ENDARTERECTOMY WITH SHUNT, BOVINE PATCH      Surgeon     Olegario Hall MD    Anesthesia     General      Estimated Blood Loss: 100ml      Specimen:          None        Findings: See below    Complications: See below    Description of Procedure     A transverse incision was made in the left neck.  The platysma was divided and superior and inferior subplatysmal flaps were created.  Dissection continued along the anterior border of the sternocleidomastoid muscle.  The carotid sheath was entered and the common carotid, internal carotid, and external carotid arteries were dissected free and control was obtained.  In the internal carotid artery dissection continued distally.  There was an intense inflammatory reaction around the internal carotid artery.  The hypoglossal nerve was identified and protected.  Systemic heparin was given and clamps were applied.  Arteriotomy was made that extended onto the internal and common carotid arteries.  There was a large amount of thrombotic plaque that was identified.  The clamp was taken off from the internal carotid artery.  There was very minimal backbleeding.   Thrombectomy was performed that revealed a small amount of thrombus that was retrieved and there was backbleeding that was returned.  A Lane shunt was inserted into the internal carotid artery followed by the common carotid artery.  Continuous flow was restored to the left cerebral hemisphere.  Endarterectomy was performed with excellent endpoints both proximally and distally.  Endarterectomy was also performed of the proximal external carotid artery.  All loose fronds of intima were removed and the defect was patched with a bovine pericardial patch with a combination of 6-0 and 5-0 Prolene suture in a running fashion.  The shunt was removed and the patch was flushed.  Clamps were removed and there was excellent signal in the internal and external carotid arteries.  Throughout the clamp time there were no changes in EEG monitoring.  Hemostasis obtained with thrombin Gelfoam and the wound was closed with 3-0 Vicryl for the platysmal layer followed by 4-0 Monocryl subcuticular for the skin.  Skin glue was applied.  At the end of the case the patient was awake and alert.  He had some residual weakness on the right side.  He can move his right foot and his right upper arm.  However, he was not completely back to baseline.  Plan will be to watch him in the recovery room and obtain a CT angiogram of the neck.      Olegario Hall MD, MD     Date: 11/3/2021  Time: 10:16 EDT

## 2021-11-04 LAB
ALBUMIN SERPL-MCNC: 2.9 G/DL (ref 3.5–5.2)
ALBUMIN/GLOB SERPL: 0.9 G/DL
ALP SERPL-CCNC: 69 U/L (ref 39–117)
ALT SERPL W P-5'-P-CCNC: 8 U/L (ref 1–41)
ANION GAP SERPL CALCULATED.3IONS-SCNC: 7 MMOL/L (ref 5–15)
AST SERPL-CCNC: 17 U/L (ref 1–40)
BASOPHILS # BLD AUTO: 0.03 10*3/MM3 (ref 0–0.2)
BASOPHILS NFR BLD AUTO: 0.3 % (ref 0–1.5)
BILIRUB SERPL-MCNC: 0.3 MG/DL (ref 0–1.2)
BUN SERPL-MCNC: 17 MG/DL (ref 8–23)
BUN/CREAT SERPL: 9.2 (ref 7–25)
CALCIUM SPEC-SCNC: 8.7 MG/DL (ref 8.6–10.5)
CHLORIDE SERPL-SCNC: 105 MMOL/L (ref 98–107)
CO2 SERPL-SCNC: 24 MMOL/L (ref 22–29)
CREAT SERPL-MCNC: 1.85 MG/DL (ref 0.76–1.27)
DEPRECATED RDW RBC AUTO: 44.3 FL (ref 37–54)
EOSINOPHIL # BLD AUTO: 0.02 10*3/MM3 (ref 0–0.4)
EOSINOPHIL NFR BLD AUTO: 0.2 % (ref 0.3–6.2)
ERYTHROCYTE [DISTWIDTH] IN BLOOD BY AUTOMATED COUNT: 12.5 % (ref 12.3–15.4)
GFR SERPL CREATININE-BSD FRML MDRD: 35 ML/MIN/1.73
GLOBULIN UR ELPH-MCNC: 3.4 GM/DL
GLUCOSE SERPL-MCNC: 121 MG/DL (ref 65–99)
HCT VFR BLD AUTO: 34.1 % (ref 37.5–51)
HGB BLD-MCNC: 11.6 G/DL (ref 13–17.7)
IMM GRANULOCYTES # BLD AUTO: 0.04 10*3/MM3 (ref 0–0.05)
IMM GRANULOCYTES NFR BLD AUTO: 0.4 % (ref 0–0.5)
LYMPHOCYTES # BLD AUTO: 2.42 10*3/MM3 (ref 0.7–3.1)
LYMPHOCYTES NFR BLD AUTO: 23.2 % (ref 19.6–45.3)
MAGNESIUM SERPL-MCNC: 1.5 MG/DL (ref 1.6–2.4)
MCH RBC QN AUTO: 32.7 PG (ref 26.6–33)
MCHC RBC AUTO-ENTMCNC: 34 G/DL (ref 31.5–35.7)
MCV RBC AUTO: 96.1 FL (ref 79–97)
MONOCYTES # BLD AUTO: 0.86 10*3/MM3 (ref 0.1–0.9)
MONOCYTES NFR BLD AUTO: 8.2 % (ref 5–12)
NEUTROPHILS NFR BLD AUTO: 67.7 % (ref 42.7–76)
NEUTROPHILS NFR BLD AUTO: 7.08 10*3/MM3 (ref 1.7–7)
NRBC BLD AUTO-RTO: 0 /100 WBC (ref 0–0.2)
PHOSPHATE SERPL-MCNC: 2.9 MG/DL (ref 2.5–4.5)
PLATELET # BLD AUTO: 141 10*3/MM3 (ref 140–450)
PMV BLD AUTO: 10 FL (ref 6–12)
POTASSIUM SERPL-SCNC: 4.2 MMOL/L (ref 3.5–5.2)
PROT SERPL-MCNC: 6.3 G/DL (ref 6–8.5)
RBC # BLD AUTO: 3.55 10*6/MM3 (ref 4.14–5.8)
SODIUM SERPL-SCNC: 136 MMOL/L (ref 136–145)
WBC # BLD AUTO: 10.45 10*3/MM3 (ref 3.4–10.8)

## 2021-11-04 PROCEDURE — 80053 COMPREHEN METABOLIC PANEL: CPT | Performed by: INTERNAL MEDICINE

## 2021-11-04 PROCEDURE — 83735 ASSAY OF MAGNESIUM: CPT | Performed by: INTERNAL MEDICINE

## 2021-11-04 PROCEDURE — 85025 COMPLETE CBC W/AUTO DIFF WBC: CPT | Performed by: INTERNAL MEDICINE

## 2021-11-04 PROCEDURE — 84100 ASSAY OF PHOSPHORUS: CPT | Performed by: INTERNAL MEDICINE

## 2021-11-04 PROCEDURE — 94799 UNLISTED PULMONARY SVC/PX: CPT

## 2021-11-04 RX ADMIN — APIXABAN 2.5 MG: 2.5 TABLET, FILM COATED ORAL at 20:14

## 2021-11-04 RX ADMIN — ATORVASTATIN CALCIUM 40 MG: 40 TABLET, FILM COATED ORAL at 20:14

## 2021-11-04 RX ADMIN — METOPROLOL SUCCINATE 50 MG: 50 TABLET, EXTENDED RELEASE ORAL at 20:14

## 2021-11-04 RX ADMIN — METOPROLOL SUCCINATE 50 MG: 50 TABLET, EXTENDED RELEASE ORAL at 09:44

## 2021-11-04 RX ADMIN — FERROUS SULFATE TAB 325 MG (65 MG ELEMENTAL FE) 325 MG: 325 (65 FE) TAB at 09:44

## 2021-11-04 RX ADMIN — APIXABAN 2.5 MG: 2.5 TABLET, FILM COATED ORAL at 09:44

## 2021-11-04 RX ADMIN — PANTOPRAZOLE SODIUM 40 MG: 40 TABLET, DELAYED RELEASE ORAL at 09:44

## 2021-11-04 NOTE — PROGRESS NOTES
[facility]  VASCULAR SURGERY PROGRESS NOTE         Patient Name: Suhail Springer  : 1935  MRN: [id number]  Primary Care Physician:  Swati Hartley PA  Date of admission: 11/3/2021    Subjective     Postop day 1 after left carotid endarterectomy.  Doing well with no complaints.  Blood pressure controlled.  Neurologic status back to baseline.        Objective     Vitals:   Temp:  [97.4 °F (36.3 °C)-98.2 °F (36.8 °C)] 98.2 °F (36.8 °C)  Heart Rate:  [68-76] 70  Resp:  [18-22] 18  BP: (139)/(68) 139/68  Arterial Line BP: (119-161)/(56-78) 161/72  Flow (L/min):  [2-6] 2    Physical Exam  Constitutional:       Appearance: Normal appearance.   HENT:      Head: Normocephalic and atraumatic.   Eyes:      Extraocular Movements: Extraocular movements intact.      Pupils: Pupils are equal, round, and reactive to light.   Neck:      Comments: Neck incision stable with mild hematoma  Cardiovascular:      Rate and Rhythm: Normal rate and regular rhythm.   Pulmonary:      Effort: Pulmonary effort is normal.      Breath sounds: Normal breath sounds.   Abdominal:      General: Abdomen is flat. Bowel sounds are normal.      Palpations: Abdomen is soft.   Musculoskeletal:      Cervical back: Normal range of motion and neck supple.   Skin:     General: Skin is warm and dry.   Neurological:      Mental Status: He is alert and oriented to person, place, and time. Mental status is at baseline.          Result Review    Result Review:  I have personally reviewed the results from the time of this admission to 2021 09:51 EDT and agree with these findings:  [x]  Laboratory  []  Microbiology  []  Radiology  []  EKG/Telemetry   []  Cardiology/Vascular   []  Pathology  []  Old records  []  Other:  Most notable findings include:     Assessment / Plan     Brief Patient Summary:  Suhail Springer is a 85 y.o. male who is postop day 1 after left carotid endarterectomy    Active Hospital Problems:  Active Hospital Problems     Diagnosis    • **Stenosis of left carotid artery          Plan:     Transfer to floor  DC art line  PT OT consult for rehab placement.  Patient was at rehab before after his stroke and needs to go back for further physical therapy.  Social work for placement.      Electronically signed by Olegario Hall MD, MD, 11/04/21, 9:51 AM EDT.

## 2021-11-05 VITALS
SYSTOLIC BLOOD PRESSURE: 127 MMHG | BODY MASS INDEX: 25.65 KG/M2 | TEMPERATURE: 99.4 F | DIASTOLIC BLOOD PRESSURE: 70 MMHG | WEIGHT: 159.61 LBS | HEART RATE: 75 BPM | OXYGEN SATURATION: 97 % | RESPIRATION RATE: 18 BRPM | HEIGHT: 66 IN

## 2021-11-05 PROCEDURE — 97161 PT EVAL LOW COMPLEX 20 MIN: CPT

## 2021-11-05 PROCEDURE — 97166 OT EVAL MOD COMPLEX 45 MIN: CPT

## 2021-11-05 PROCEDURE — 94799 UNLISTED PULMONARY SVC/PX: CPT

## 2021-11-05 RX ORDER — ASPIRIN 81 MG/1
81 TABLET ORAL DAILY
Qty: 90 TABLET | Refills: 10 | Status: SHIPPED | OUTPATIENT
Start: 2021-11-05 | End: 2024-07-22

## 2021-11-05 RX ADMIN — PANTOPRAZOLE SODIUM 40 MG: 40 TABLET, DELAYED RELEASE ORAL at 06:00

## 2021-11-05 RX ADMIN — METOPROLOL SUCCINATE 50 MG: 50 TABLET, EXTENDED RELEASE ORAL at 08:27

## 2021-11-05 RX ADMIN — APIXABAN 2.5 MG: 2.5 TABLET, FILM COATED ORAL at 08:27

## 2021-11-05 RX ADMIN — FERROUS SULFATE TAB 325 MG (65 MG ELEMENTAL FE) 325 MG: 325 (65 FE) TAB at 08:27

## 2021-11-05 NOTE — PLAN OF CARE
Goal Outcome Evaluation:  Plan of Care Reviewed With: patient           Outcome Summary: Patient is alert and demonstrates being orientation by correct answers to yes or no questions, Patient does not use his call bell appropriately but tries to be continent and use urinal to the best of his functional ability. VSS, no s/s of distress noted.

## 2021-11-05 NOTE — DISCHARGE SUMMARY
Date of Discharge:  11/5/2021    Discharge Diagnosis: Severe symptomatic left carotid stenosis    Presenting Problem/History of Present Illness  Active Hospital Problems    Diagnosis  POA   • **Stenosis of left carotid artery [I65.22]  Yes      Resolved Hospital Problems   No resolved problems to display.          Hospital Course  Patient is a 85 y.o. male presented with severe symptomatic left carotid stenosis.  He had a stroke 3 weeks prior.  He was in rehab prior to admission.  He underwent left carotid endarterectomy without event.  Postoperatively his neurologic status returned to baseline and he was stable for discharge on postop day 2..      Procedures Performed    Procedure(s):  LEFT CAROTID ENDARTERECTOMY WITH SHUNT, BOVINE PATCH  -------------------       Consults:   Consults     No orders found from 10/5/2021 to 11/4/2021.          Pertinent Test Results: CT angiogram shows patent left internal carotid artery    Condition on Discharge: Stable and back to baseline    Vital Signs  Temp:  [98.2 °F (36.8 °C)-99.4 °F (37.4 °C)] 99.4 °F (37.4 °C)  Heart Rate:  [58-86] 75  Resp:  [14-18] 18  BP: (118-142)/(70-83) 127/70    Physical Exam:     General Appearance:    Alert, cooperative, in no acute distress   Head:    Normocephalic, without obvious abnormality, atraumatic   Eyes:            Lids and lashes normal, conjunctivae and sclerae normal, no   icterus, no pallor, corneas clear, PERRLA   Ears:    Ears appear intact with no abnormalities noted   Throat:   No oral lesions, no thrush, oral mucosa moist   Neck:   No adenopathy, supple, trachea midline, no thyromegaly, no   carotid bruit, no JVD, incision intact with mild hematoma   Back:     No kyphosis present, no scoliosis present, no skin lesions,      erythema or scars, no tenderness to percussion or                   palpation,   range of motion normal   Lungs:     Clear to auscultation,respirations regular, even and                  unlabored    Heart:     Regular rhythm and normal rate, normal S1 and S2, no            murmur, no gallop, no rub, no click   Chest Wall:    No abnormalities observed   Abdomen:     Normal bowel sounds, no masses, no organomegaly, soft        non-tender, non-distended, no guarding, no rebound                tenderness   Rectal:     Deferred       Pulses:   Pulses palpable and equal bilaterally   Skin:   No bleeding, bruising or rash   Lymph nodes:   No palpable adenopathy     Discharge Disposition  Rehab Facility or Unit (DC - External)    Discharge Medications     Discharge Medications      New Medications      Instructions Start Date   aspirin 81 MG EC tablet   81 mg, Oral, Daily         Continue These Medications      Instructions Start Date   apixaban 5 MG tablet tablet  Commonly known as: ELIQUIS   5 mg, Oral, 2 Times Daily, PER DR HALL TO STOP 3 DAYS PRIOR TO SURGERY, PT REPORTED LAST DOSE 10-30-21      atorvastatin 40 MG tablet  Commonly known as: LIPITOR   40 mg, Oral, Nightly      ferrous sulfate 325 (65 FE) MG tablet   325 mg, Oral, Daily With Breakfast      fluticasone 50 MCG/ACT nasal spray  Commonly known as: FLONASE   1 spray, Nasal, Daily PRN      metoprolol succinate XL 25 MG 24 hr tablet  Commonly known as: TOPROL-XL   50 mg, Oral, 2 Times Daily      omeprazole 40 MG capsule  Commonly known as: priLOSEC   40 mg, Oral, Daily      vitamin B-12 500 MCG tablet  Commonly known as: CYANOCOBALAMIN   500 mcg, Oral, Daily             Discharge Diet: Regular diet    Activity at Discharge: Activity as tolerated per physical therapy    Follow-up Appointments  Future Appointments   Date Time Provider Department Center   11/18/2021  1:00 PM Olegario Hall MD [facility]         Test Results Pending at Discharge       Olegario Hall MD, MD  11/05/21  12:50 EDT

## 2021-11-05 NOTE — THERAPY EVALUATION
Acute Care - Physical Therapy Initial Evaluation   Maria E     Patient Name: Suhail Springer  : 1935  MRN: [id number]  Today's Date: 2021      Visit Dx:     ICD-10-CM ICD-9-CM   1. Decreased activities of daily living (ADL)  Z78.9 V49.89   2. Stenosis of left carotid artery  I65.22 433.10   3. 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   • Hyperlipidemia    • Hypertension    • Pacemaker    • Sleep apnea     NO CPAP   • Stroke (HCC)     2021- WENT TO REHAB- DISCHARGED 21- GENERALIZED WEAKNESS      Past Surgical History:   Procedure Laterality Date   • CAROTID ENDARTERECTOMY Left 11/3/2021    Procedure: LEFT CAROTID ENDARTERECTOMY WITH SHUNT, BOVINE PATCH;  Surgeon: Olegario Hall MD;  Location: [facility];  Service: Vascular;  Laterality: Left;   • EXCISION LESION      PRE CANCERIOUS LESION REMOVED     PT Assessment (last 12 hours)     PT Evaluation and Treatment     Row Name 21 1023          Physical Therapy Time and Intention    Document Type evaluation  -AV     Mode of Treatment individual therapy; physical therapy  -AV     Row Name 21 1023          General Information    Patient Profile Reviewed yes  -AV     Prior Level of Function independent:  Prior to recent stroke  -AV     Equipment Currently Used at Home --  Pt unable to provide answer.  -AV     Existing Precautions/Restrictions fall  -AV     Row Name 21 1023          Living Environment    Current Living Arrangements home/apartment/condo  Pt  recently been in rehab at [facility] for stroke  -AV     Lives With spouse  -AV     Row Name 11/05/21 1023          Range of Motion (ROM)    Range of Motion bilateral lower extremities; ROM is WFL  -AV     Row Name 11/05/21 1023          Strength (Manual Muscle Testing)    Strength (Manual Muscle Testing) bilateral lower extremities; strength is WFL  -AV     Row Name 11/05/21 1023          Bed Mobility    Bed Mobility supine-sit; sit-supine  -AV     Supine-Sit Slope (Bed Mobility) minimum assist (75% patient effort)  -AV     Sit-Supine Slope (Bed Mobility) standby assist  -AV     Bed Mobility, Safety Issues cognitive deficits limit understanding; decreased use of arms for pushing/pulling; decreased use of legs for bridging/pushing  -AV     Assistive Device (Bed Mobility) head of bed elevated; bed rails  -AV     Row Name 11/05/21 1023          Transfers    Transfers sit-stand transfer  -AV     Sit-Stand Slope (Transfers) contact guard  -AV     Row Name 11/05/21 1023          Sit-Stand Transfer    Assistive Device (Sit-Stand Transfers) --  HHA  -AV     Row Name 11/05/21 1023          Gait/Stairs (Locomotion)    Comment (Gait/Stairs) Deferred due to safety  -AV     Row Name 11/05/21 1023          Safety Issues, Functional Mobility    Safety Issues Affecting Function (Mobility) ability to follow commands; impulsivity; judgment; safety precaution awareness  -AV     Impairments Affecting Function (Mobility) balance; cognition; endurance/activity tolerance  -AV     Cognitive Impairments, Mobility Safety/Performance impulsivity; judgment; safety precaution awareness  -AV     Row Name 11/05/21 1023          Balance    Balance Assessment standing static balance  -AV     Static Sitting Balance mild impairment  -AV     Row Name             Wound 11/03/21 0800 Left throat Incision    Wound - Properties Group Placement Date: 11/03/21  -HB Placement Time: 0800  -HB Present on Hospital Admission: N  -HB Side:  Left  -HB Location: throat  -HB Primary Wound Type: Incision  -HB     Retired Wound - Properties Group Date first assessed: 11/03/21  -HB Time first assessed: 0800  -HB Present on Hospital Admission: N  -HB Side: Left  -HB Location: throat  -HB Primary Wound Type: Incision  -HB     Row Name 11/05/21 1108          Plan of Care Review    Plan of Care Reviewed With patient  -AV     Progress no change  -AV     Outcome Summary Patient presents with deficits impacting balance, transfers, and ambulation. Patient will benefit from skilled PT services to address these mobility deficits and decrease risk of falls to maximize independence.  -AV     Row Name 11/05/21 1108          Physical Therapy Goals    Bed Mobility Goal Selection (PT) bed mobility, PT goal 1  -AV     Transfer Goal Selection (PT) transfer, PT goal 1  -AV     Gait Training Goal Selection (PT) gait training, PT goal 1  -AV     Row Name 11/05/21 1108          Bed Mobility Goal 1 (PT)    Activity/Assistive Device (Bed Mobility Goal 1, PT) bed mobility activities, all  -AV     [place] Level/Cues Needed (Bed Mobility Goal 1, PT) modified independence  -AV     Time Frame (Bed Mobility Goal 1, PT) long term goal (LTG); 10 days  -AV     Row Name 11/05/21 1108          Transfer Goal 1 (PT)    Activity/Assistive Device (Transfer Goal 1, PT) sit-to-stand/stand-to-sit; bed-to-chair/chair-to-bed  -AV     [place] Level/Cues Needed (Transfer Goal 1, PT) modified independence  -AV     Time Frame (Transfer Goal 1, PT) long term goal (LTG); 10 days  -AV     Row Name 11/05/21 1108          Gait Training Goal 1 (PT)    Activity/Assistive Device (Gait Training Goal 1, PT) gait (walking locomotion); assistive device use; walker, rolling  -AV     [place] Level (Gait Training Goal 1, PT) modified independence  -AV     Distance (Gait Training Goal 1, PT) 200  -AV     Time Frame (Gait Training Goal 1, PT) long term goal (LTG); 10 days  -AV     Row Name 11/05/21 1108           Therapy Assessment/Plan (PT)    Rehab Potential (PT) good, to achieve stated therapy goals  -AV     Criteria for Skilled Interventions Met (PT) yes; meets criteria  -AV     Problem List (PT) problems related to; balance; cognition; mobility  -AV     Activity Limitations Related to Problem List (PT) unable to transfer safely; unable to ambulate safely  -AV     Row Name 11/05/21 1108          PT Evaluation Complexity    History, PT Evaluation Complexity 1-2 personal factors and/or comorbidities  -AV     Examination of Body Systems (PT Eval Complexity) total of 4 or more elements  -AV     Clinical Presentation (PT Evaluation Complexity) stable  -AV     Clinical Decision Making (PT Evaluation Complexity) low complexity  -AV     Overall Complexity (PT Evaluation Complexity) low complexity  -AV     Row Name 11/05/21 1108          Therapy Plan Review/Discharge Plan (PT)    Therapy Plan Review (PT) evaluation/treatment results reviewed; patient  -AV           User Key  (r) = Recorded By, (t) = Taken By, (c) = Cosigned By    Initials Name Provider Type    Jessi Arce, RN Registered Nurse    Monroe Swartz, PT Physical Therapist              Physical Therapy Education                 Title: PT OT SLP Therapies (In Progress)     Topic: Physical Therapy (Done)     Point: Mobility training (Done)     Learning Progress Summary           Patient Acceptance, D,E, NR,DU by AV at 11/5/2021 1112                   Point: Home exercise program (Done)     Learning Progress Summary           Patient Acceptance, D,E, NR,DU by AV at 11/5/2021 1112                   Point: Body mechanics (Done)     Learning Progress Summary           Patient Acceptance, D,E, NR,DU by AV at 11/5/2021 1112                   Point: Precautions (Done)     Learning Progress Summary           Patient Acceptance, D,E, NR,DU by AV at 11/5/2021 1112                               User Key     Initials Effective Dates Name Provider Type Discipline     AV 06/11/21 -  Monroe Nguyen, PT Physical Therapist PT              PT Recommendation and Plan  Anticipated Discharge Disposition (PT): inpatient rehabilitation facility, sub acute care setting, skilled nursing facility  Planned Therapy Interventions (PT): balance training, bed mobility training, gait training, neuromuscular re-education, strengthening, transfer training  Therapy Frequency (PT): daily  Plan of Care Reviewed With: patient  Progress: no change  Outcome Summary: Patient presents with deficits impacting balance, transfers, and ambulation. Patient will benefit from skilled PT services to address these mobility deficits and decrease risk of falls to maximize independence.   Outcome Measures     Row Name 11/05/21 1100             How much help from another person do you currently need...    Turning from your back to your side while in flat bed without using bedrails? 3  -AV      Moving from lying on back to sitting on the side of a flat bed without bedrails? 3  -AV      Moving to and from a bed to a chair (including a wheelchair)? 3  -AV      Standing up from a chair using your arms (e.g., wheelchair, bedside chair)? 3  -AV      Climbing 3-5 steps with a railing? 3  -AV      To walk in hospital room? 3  -AV      AM-PAC 6 Clicks Score (PT) 18  -AV              Functional Assessment    Outcome Measure Options AM-PAC 6 Clicks Basic Mobility (PT)  -AV            User Key  (r) = Recorded By, (t) = Taken By, (c) = Cosigned By    Initials Name Provider Type    AV Monroe Nguyen, PT Physical Therapist                 Time Calculation:    PT Charges     Row Name 11/05/21 1110             Time Calculation    PT Received On 11/05/21  -AV      PT Goal Re-Cert Due Date 11/14/21  -AV              Untimed Charges    PT Eval/Re-eval Minutes 35  -AV              Total Minutes    Untimed Charges Total Minutes 35  -AV       Total Minutes 35  -AV            User Key  (r) = Recorded By, (t) = Taken By, (c) = Cosigned  By    Initials Name Provider Type    AV Monroe Nguyen, PT Physical Therapist              Therapy Charges for Today     Code Description Service Date Service Provider Modifiers Qty    [id number] HC PT EVAL LOW COMPLEXITY 3 11/5/2021 Monroe Nguyen, PT GP 1          PT G-Codes  Outcome Measure Options: AM-PAC 6 Clicks Basic Mobility (PT)  AM-PAC 6 Clicks Score (PT): 18  AM-PAC 6 Clicks Score (OT): 11    Monroe Nguyen PT  11/5/2021

## 2021-11-05 NOTE — NURSING NOTE
During shift assessment patient was noted to have a hoarse and wet sounding voice. Patient is post stroke, bed side swallow evaluation administered by this nurse prior to PO pills. Patient is alert and oriented, can answer yes or no questions correctly and able to follow commands. Patient was able to cough and clear throat after swallowing sips of water with no issues. Swallow screen passed, will continue to monitor.

## 2021-11-05 NOTE — PLAN OF CARE
Goal Outcome Evaluation:  Plan of Care Reviewed With: patient        Progress: no change  Outcome Summary: Patient presents with deficits impacting balance, transfers, and ambulation. Patient will benefit from skilled PT services to address these mobility deficits and decrease risk of falls to maximize independence.

## 2021-11-05 NOTE — THERAPY EVALUATION
Patient Name: Suhail Springer  : 1935    MRN: [id number]                              Today's Date: 2021       Admit Date: 11/3/2021    Visit Dx:     ICD-10-CM ICD-9-CM   1. Decreased activities of daily living (ADL)  Z78.9 V49.89   2. 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   • Hyperlipidemia    • Hypertension    • Pacemaker    • Sleep apnea     NO CPAP   • Stroke (HCC)     2021- WENT TO REHAB- DISCHARGED 21- GENERALIZED WEAKNESS      Past Surgical History:   Procedure Laterality Date   • CAROTID ENDARTERECTOMY Left 11/3/2021    Procedure: LEFT CAROTID ENDARTERECTOMY WITH SHUNT, BOVINE PATCH;  Surgeon: Olegario Hall MD;  Location: [facility] MAIN OR;  Service: Vascular;  Laterality: Left;   • EXCISION LESION      PRE CANCERIOUS LESION REMOVED      General Information     Row Name 21 1018          OT Time and Intention    Document Type evaluation  -AV     Mode of Treatment individual therapy; occupational therapy  -AV     Row Name 21 1018          General Information    Patient Profile Reviewed yes  -AV     Prior Level of Function independent:; ADL's  Prior to recent stroke  -AV     Existing Precautions/Restrictions fall  -AV     Barriers to Rehab none identified  -AV     Row Name 21 1018          Occupational Profile    Reason for Services/Referral (Occupational Profile) Patient is a 85 year old male admitted to [facility] on November 3, 2021.  He is currently postop day 1: Left  CEA/ currently on the fourth floor/2 L O2.   OT consulted due to recent decline in ADL/transfer independence.  No previous OT services for current condition.  -AV     Row Name 11/05/21 1018          Living Environment    Lives With spouse  -AV     Row Name 11/05/21 1018          Home Main Entrance    Number of Stairs, Main Entrance none  -AV     Row Name 11/05/21 1018          Cognition    Orientation Status (Cognition) --  Alert, pleasant and cooperative.  Follows commands with repeated directions.  Expressive aphasia  -     Row Name 11/05/21 1018          Safety Issues, Functional Mobility    Impairments Affecting Function (Mobility) balance; cognition; endurance/activity tolerance  -AV           User Key  (r) = Recorded By, (t) = Taken By, (c) = Cosigned By    Initials Name Provider Type    Antwon Sarmiento OT Occupational Therapist                 Mobility/ADL's     Row Name 11/05/21 1020          Bed Mobility    Bed Mobility supine-sit  Supine to long sit min assist  -     Row Name 11/05/21 1020          Activities of Daily Living    BADL Assessment/Intervention --  Max assist ADLs  -AV           User Key  (r) = Recorded By, (t) = Taken By, (c) = Cosigned By    Initials Name Provider Type    Antwon Sarmiento OT Occupational Therapist               Obj/Interventions     Row Name 11/05/21 1021          Sensory Assessment (Somatosensory)    Sensory Assessment (Somatosensory) unable/difficult to assess  Due to expressive aphasia  -AV     Row Name 11/05/21 1021          Vision Assessment/Intervention    Visual Impairment/Limitations WFL  Patient able to accurately retrieve objects-glasses  -     Row Name 11/05/21 1021          Range of Motion Comprehensive    General Range of Motion bilateral upper extremity ROM WFL  Left upper extremity-AROM.  Right upper extremity AAROM  -     Row Name 11/05/21 1021          Strength Comprehensive (MMT)    Comment, General Manual Muscle Testing (MMT) Assessment Left  upper extremity 5/5.  Right anterior deltoid, bicep and tricep,  2 -/5  -AV     Row Name 11/05/21 1021          Motor Skills    Motor Skills coordination; functional endurance  -AV     Coordination --  Impaired-recent CVA  -AV     Functional Endurance Poor plus  -AV     Row Name 11/05/21 1021          Balance    Balance Assessment sitting static balance  -AV     Static Sitting Balance mild impairment  CGA Long sit  -AV           User Key  (r) = Recorded By, (t) = Taken By, (c) = Cosigned By    Initials Name Provider Type    AV Antwon Albright OT Occupational Therapist               Goals/Plan     Row Name 11/05/21 1024          Transfer Goal 1 (OT)    Activity/Assistive Device (Transfer Goal 1, OT) transfers, all; walker, rolling  -AV     [place] Level/Cues Needed (Transfer Goal 1, OT) contact guard assist; minimum assist (75% or more patient effort); verbal cues required  -AV     Time Frame (Transfer Goal 1, OT) long term goal (LTG); 10 days  -AV     Row Name 11/05/21 1024          Bathing Goal 1 (OT)    Activity/Device (Bathing Goal 1, OT) bathing skills, all  -AV     [place] Level/Cues Needed (Bathing Goal 1, OT) contact guard assist; minimum assist (75% or more patient effort); set-up required; verbal cues required  -AV     Time Frame (Bathing Goal 1, OT) long term goal (LTG); 10 days  -AV     Row Name 11/05/21 1024          Dressing Goal 1 (OT)    Activity/Device (Dressing Goal 1, OT) dressing skills, all  -AV     [place]/Cues Needed (Dressing Goal 1, OT) contact guard assist; minimum assist (75% or more patient effort); set-up required; verbal cues required  -AV     Time Frame (Dressing Goal 1, OT) long term goal (LTG); 10 days  -AV     Row Name 11/05/21 1024          Toileting Goal 1 (OT)    Activity/Device (Toileting Goal 1, OT) toileting skills, all; commode, 3-in-1  -AV     [place] Level/Cues Needed (Toileting Goal 1, OT) contact guard assist; minimum assist (75% or more patient  effort); set-up required; verbal cues required  -AV     Time Frame (Toileting Goal 1, OT) long term goal (LTG); 10 days  -AV     Row Name 11/05/21 1024          Grooming Goal 1 (OT)    Activity/Device (Grooming Goal 1, OT) grooming skills, all  -AV     [place] (Grooming Goal 1, OT) contact guard assist; minimum assist (75% or more patient effort); set-up required; verbal cues required  -AV     Time Frame (Grooming Goal 1, OT) long term goal (LTG); 10 days  -AV     Row Name 11/05/21 1024          Self-Feeding Goal 1 (OT)    Activity/Device (Self-Feeding Goal 1, OT) self-feeding skills, all  -AV     [place] Level/Cues Needed (Self-Feeding Goal 1, OT) contact guard assist; minimum assist (75% or more patient effort); set-up required; verbal cues required  -AV     Time Frame (Self-Feeding Goal 1, OT) long term goal (LTG); 10 days  -AV     Row Name 11/05/21 1024          Strength Goal 1 (OT)    Strength Goal 1 (OT) Patient will demonstrate 4+/5 right upper extremity to increase ADL and transfer independence.  -AV     Time Frame (Strength Goal 1, OT) long term goal (LTG); 10 days  -AV     Row Name 11/05/21 1024          Problem Specific Goal 1 (OT)    Problem Specific Goal 1 (OT) Patient will demonstrate fair/fair plus endurance/activity tolerance needed to support ADLs.  -AV     Time Frame (Problem Specific Goal 1, OT) long term goal (LTG); 10 days  -AV     Row Name 11/05/21 1024          Therapy Assessment/Plan (OT)    Planned Therapy Interventions (OT) activity tolerance training; BADL retraining; functional balance retraining; occupation/activity based interventions; patient/caregiver education/training; strengthening exercise; transfer/mobility retraining  -AV           User Key  (r) = Recorded By, (t) = Taken By, (c) = Cosigned By    Initials Name Provider Type    Antwon Sarmiento, OT Occupational Therapist               Clinical Impression     Row Name 11/05/21 1023          Pain Assessment    Additional  Documentation Pain Scale: FACES Pre/Post-Treatment (Group)  -AV     Row Name 11/05/21 1023          Pain Scale: FACES Pre/Post-Treatment    Pain: FACES Scale, Pretreatment 0-->no hurt  -AV     Posttreatment Pain Rating 0-->no hurt  -AV     Row Name 11/05/21 1023          Plan of Care Review    Plan of Care Reviewed With patient  -AV     Progress no change  First session for evaluation  -AV     Outcome Summary Patient presents with limitations of balance, right upper extremity strength, coordination, endurance/activity tolerance which impede his ability to perform ADL and transfers as prior.  The skills of a therapist will be required to safely and effectively implement treatment plan to restore maximal level of function.  -AV     Row Name 11/05/21 1023          Therapy Assessment/Plan (OT)    Patient/Family Therapy Goal Statement (OT) Maximize independence  -AV     Rehab Potential (OT) good, to achieve stated therapy goals  -AV     Criteria for Skilled Therapeutic Interventions Met (OT) yes; meets criteria; skilled treatment is necessary  -AV     Therapy Frequency (OT) 5 times/wk  -AV     Row Name 11/05/21 1023          Therapy Plan Review/Discharge Plan (OT)    Anticipated Discharge Disposition (OT) skilled nursing facility; inpatient rehabilitation facility; sub acute care setting  -AV     Row Name 11/05/21 1023          Vital Signs    O2 Delivery Pre Treatment nasal cannula  2 L  -AV     O2 Delivery Intra Treatment nasal cannula  2 L  -AV     O2 Delivery Post Treatment nasal cannula  2 L  -AV           User Key  (r) = Recorded By, (t) = Taken By, (c) = Cosigned By    Initials Name Provider Type    Antwon Sarmiento, OT Occupational Therapist               Outcome Measures     Row Name 11/05/21 1026          How much help from another is currently needed...    Putting on and taking off regular lower body clothing? 1  -AV     Bathing (including washing, rinsing, and drying) 2  -AV     Toileting (which includes  using toilet bed pan or urinal) 2  -AV     Putting on and taking off regular upper body clothing 2  -AV     Taking care of personal grooming (such as brushing teeth) 2  -AV     Eating meals 2  -AV     AM-PAC 6 Clicks Score (OT) 11  -AV     Row Name 11/05/21 1026          Functional Assessment    Outcome Measure Options AM-PAC 6 Clicks Daily Activity (OT); Optimal Instrument  -AV     Row Name 11/05/21 1026          Optimal Instrument    Optimal Instrument Optimal - 3  -AV     Bending/Stooping 2  -AV     Standing 5  -AV     Reaching 3  -AV     From the list, choose the 3 activities you would most like to be able to do without any difficulty Bending/stooping; Standing; Reaching  -AV     Total Score Optimal - 3 10  -AV           User Key  (r) = Recorded By, (t) = Taken By, (c) = Cosigned By    Initials Name Provider Type    Antwon Sarmiento OT Occupational Therapist                Occupational Therapy Education                 Title: PT OT SLP Therapies (Done)     Topic: Occupational Therapy (Done)     Point: ADL training (Done)     Description:   Instruct learner(s) on proper safety adaptation and remediation techniques during self care or transfers.   Instruct in proper use of assistive devices.              Learning Progress Summary           Patient Acceptance, E, VU by AV at 11/5/2021 1027                   Point: Home exercise program (Done)     Description:   Instruct learner(s) on appropriate technique for monitoring, assisting and/or progressing therapeutic exercises/activities.              Learning Progress Summary           Patient Acceptance, E, VU by AV at 11/5/2021 1027                   Point: Precautions (Done)     Description:   Instruct learner(s) on prescribed precautions during self-care and functional transfers.              Learning Progress Summary           Patient Acceptance, E, VU by AV at 11/5/2021 1027                   Point: Body mechanics (Done)     Description:   Instruct learner(s) on  proper positioning and spine alignment during self-care, functional mobility activities and/or exercises.              Learning Progress Summary           Patient Acceptance, E, VU by AV at 11/5/2021 1027                               User Key     Initials Effective Dates Name Provider Type Discipline     06/16/21 -  Antwon Albright OT Occupational Therapist OT              OT Recommendation and Plan  Planned Therapy Interventions (OT): activity tolerance training, BADL retraining, functional balance retraining, occupation/activity based interventions, patient/caregiver education/training, strengthening exercise, transfer/mobility retraining  Therapy Frequency (OT): 5 times/wk  Plan of Care Review  Plan of Care Reviewed With: patient  Progress: no change (First session for evaluation)  Outcome Summary: Patient presents with limitations of balance, right upper extremity strength, coordination, endurance/activity tolerance which impede his ability to perform ADL and transfers as prior.  The skills of a therapist will be required to safely and effectively implement treatment plan to restore maximal level of function.     Time Calculation:    Time Calculation- OT     Row Name 11/05/21 1028             Time Calculation- OT    OT Received On 11/05/21  -AV      OT Goal Re-Cert Due Date 11/14/21  -AV              Untimed Charges    OT Eval/Re-eval Minutes 35  -AV              Total Minutes    Untimed Charges Total Minutes 35  -AV       Total Minutes 35  -AV            User Key  (r) = Recorded By, (t) = Taken By, (c) = Cosigned By    Initials Name Provider Type    Antwon Sarmiento OT Occupational Therapist              Therapy Charges for Today     Code Description Service Date Service Provider Modifiers Qty    [id number] HC OT EVAL MOD COMPLEXITY 3 11/5/2021 Antwon Albright OT GO 1               Antwon Albright OT  11/5/2021

## 2021-11-05 NOTE — SIGNIFICANT NOTE
11/05/21 0907   Plan   Final Discharge Disposition Code 03 - skilled nursing facility (SNF)   Final Note SW notified that pt has a bed at [facility] in [facility] today. SW placed call to facility and facility is expecting pt today if medically ready. Pt does not need covid test. MD aware. Facility phone: [phone] Fax number: [phone].

## 2021-11-05 NOTE — PROGRESS NOTES
[facility]  VASCULAR SURGERY PROGRESS NOTE          Patient Name: Suhail Springer  : 1935  MRN: [id number]  Primary Care Physician:  Swati Hartley PA  Date of admission: 11/3/2021    Subjective     Postop day two after left carotid endarterectomy. Doing well with no complaints.        Objective     Vitals:   Temp:  [98.2 °F (36.8 °C)-99.4 °F (37.4 °C)] 99.4 °F (37.4 °C)  Heart Rate:  [58-86] 70  Resp:  [12-18] 18  BP: (118-142)/(75-83) 129/77  Arterial Line BP: (143-154)/(61-67) 154/67  Flow (L/min):  [2] 2    Physical Exam  Constitutional:       Appearance: Normal appearance.   HENT:      Head: Normocephalic and atraumatic.   Eyes:      Extraocular Movements: Extraocular movements intact.      Pupils: Pupils are equal, round, and reactive to light.   Neck:      Comments: Neck incision remains soft with a mild hematoma  Cardiovascular:      Rate and Rhythm: Normal rate and regular rhythm.   Pulmonary:      Effort: Pulmonary effort is normal.      Breath sounds: Normal breath sounds.   Abdominal:      General: Abdomen is flat. Bowel sounds are normal.      Palpations: Abdomen is soft.   Musculoskeletal:      Cervical back: Normal range of motion and neck supple.   Skin:     General: Skin is warm and dry.   Neurological:      Mental Status: He is alert and oriented to person, place, and time. Mental status is at baseline.          Result Review    Result Review:  I have personally reviewed the results from the time of this admission to 2021 09:44 EDT and agree with these findings:  []  Laboratory  []  Microbiology  []  Radiology  []  EKG/Telemetry   []  Cardiology/Vascular   []  Pathology  []  Old records  []  Other:  Most notable findings include:     Assessment / Plan     Brief Patient Summary:  Suhail Springer is a 85 y.o. male who doing well after left carotid endarterectomy    Active Hospital Problems:  Active Hospital Problems    Diagnosis    • **Stenosis of left carotid artery           Plan:     Has rehab bed available and can discharge today    Electronically signed by Olegario Hall MD, MD, 11/05/21, 9:44 AM EDT.

## 2021-11-05 NOTE — PLAN OF CARE
Goal Outcome Evaluation:  Plan of Care Reviewed With: patient        Progress: no change (First session for evaluation)  Outcome Summary: Patient presents with limitations of balance, right upper extremity strength, coordination, endurance/activity tolerance which impede his ability to perform ADL and transfers as prior.  The skills of a therapist will be required to safely and effectively implement treatment plan to restore maximal level of function.

## 2021-11-06 ENCOUNTER — HOSPITAL ENCOUNTER (EMERGENCY)
Facility: HOSPITAL | Age: 86
Discharge: NURSING, REHAB OR LONG-TERM CARE | End: 2021-11-06
Attending: EMERGENCY MEDICINE | Admitting: EMERGENCY MEDICINE

## 2021-11-06 ENCOUNTER — APPOINTMENT (OUTPATIENT)
Dept: CT IMAGING | Facility: HOSPITAL | Age: 86
End: 2021-11-06

## 2021-11-06 VITALS
HEART RATE: 72 BPM | SYSTOLIC BLOOD PRESSURE: 142 MMHG | HEIGHT: 66 IN | BODY MASS INDEX: 25.76 KG/M2 | RESPIRATION RATE: 18 BRPM | TEMPERATURE: 97.3 F | OXYGEN SATURATION: 99 % | DIASTOLIC BLOOD PRESSURE: 82 MMHG

## 2021-11-06 DIAGNOSIS — S09.90XA INJURY OF HEAD, INITIAL ENCOUNTER: ICD-10-CM

## 2021-11-06 DIAGNOSIS — Z79.01 ANTICOAGULATED: ICD-10-CM
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
## 2021-11-06 DIAGNOSIS — S51.811A SKIN TEAR OF RIGHT FOREARM WITHOUT COMPLICATION, INITIAL ENCOUNTER: ICD-10-CM

## 2021-11-06 PROCEDURE — 70450 CT HEAD/BRAIN W/O DYE: CPT

## 2021-11-06 PROCEDURE — 99283 EMERGENCY DEPT VISIT LOW MDM: CPT

## 2021-11-06 PROCEDURE — 72125 CT NECK SPINE W/O DYE: CPT

## 2021-11-06 NOTE — ED NOTES
This RN gave report to Denita ARANGO at [facility].      Shanell Arizmendi, RN  11/06/21 0752

## 2021-11-06 NOTE — ED NOTES
S/w EMS answering service to set up transport for pt back to [facility] facility.   Awaiting call back for ETA of EMS.      Shanell Arizmendi, RN  11/06/21 0700

## 2021-11-06 NOTE — ED PROVIDER NOTES
EMERGENCY DEPARTMENT ENCOUNTER    Room Number:  HALA/A  Date of encounter:  11/6/2021  PCP: Swati Hartley PA  Historian: Patient, EMS      I used full protective equipment while examining this patient.  This includes face mask, gloves and protective eyewear.  I washed my hands before entering the room and immediately upon leaving the room      HPI:  Chief Complaint: Fall  A complete HPI/ROS/PMH/PSH/SH/FH are unobtainable due to: Aphasia    Context: Suhail Springer is a 85 y.o. male who presents to the ED c/o injuries sustained in a fall just prior to arrival.  Patient reportedly had an unwitnessed fall earlier this morning.  Patient has skin tears to his right forearm.  He denies any head or other trauma.  He is on Eliquis for history of atrial fibrillation.  Patient was just discharged yesterday from [facility] for left carotid endarterectomy.  Patient is aphasic at baseline.  The patient is able to answer questions by nodding his head yes and no.  He denies any other complaints, specifically headache, nausea, chest pain, shortness of breath.    Review of Medical Records  I reviewed op note from 11/3/2021.  Patient had left carotid endarterectomy performed.    PAST MEDICAL HISTORY  Active Ambulatory Problems     Diagnosis Date Noted   • Cerebrovascular accident (CVA) due to stenosis of precerebral artery (HCC) 10/28/2021   • Stenosis of left carotid artery 10/28/2021     Resolved Ambulatory Problems     Diagnosis Date Noted   • No Resolved Ambulatory Problems     Past Medical History:   Diagnosis Date   • A-fib (HCC)    • Arthritis    • Cancer (HCC)    • Carotid stenosis    • CKD (chronic kidney disease)    • CVA, old, aphasia 10/07/2021   • Hyperlipidemia    • Hypertension    • Pacemaker    • Sleep apnea    • Stroke (HCC)          PAST SURGICAL HISTORY  Past Surgical History:   Procedure Laterality Date   • CAROTID ENDARTERECTOMY Left 11/3/2021    Procedure: LEFT CAROTID ENDARTERECTOMY WITH SHUNT, BOVINE  PATCH;  Surgeon: Olegario Hall MD;  Location: [facility] MAIN OR;  Service: Vascular;  Laterality: Left;   • EXCISION LESION      PRE CANCERIOUS LESION REMOVED         FAMILY HISTORY  No family history on file.      SOCIAL HISTORY  Social History     Socioeconomic History   • Marital status:    Tobacco Use   • Smoking status: Former Smoker     Types: Cigarettes   • Smokeless tobacco: Never Used   • Tobacco comment: QUIT 40 YEARS AGO   Vaping Use   • Vaping Use: Never used   Substance and Sexual Activity   • Alcohol use: Never   • Drug use: Never   • Sexual activity: Defer         ALLERGIES  Pollen extract        REVIEW OF SYSTEMS  Limited secondary to aphasia.      PHYSICAL EXAM    I have reviewed the triage vital signs and nursing notes.    ED Triage Vitals [11/06/21 0549]   Temp Heart Rate Resp BP SpO2   97.3 °F (36.3 °C) 114 16 165/90 99 %      Temp src Heart Rate Source Patient Position BP Location FiO2 (%)   Temporal Monitor -- -- --       Physical Exam  GENERAL: Alert, aphasic, not distressed  HENT: head atraumatic, surgical wound to left neck without evidence of dehiscence or erythema.  EYES: no scleral icterus, EOMI  CV: Irregular rhythm, regular rate, no murmur  RESPIRATORY: normal effort, CTA  ABDOMEN: soft, nontender  MUSCULOSKELETAL: no deformity, FROM, no bony tenderness.  Superficial skin tears to right forearm.  NEURO: alert, aphasic, moderate right-sided weakness, follows commands  SKIN: warm, dry      RADIOLOGY  CT Head Without Contrast, CT Cervical Spine Without Contrast    Result Date: 11/6/2021  CT HEAD WO CONTRAST-, CT CERVICAL SPINE WO CONTRAST-  INDICATIONS: Trauma  TECHNIQUE: Radiation dose reduction techniques were utilized, including automated exposure control and exposure modulation based on body size. Noncontrast head CT, cervical spine CT  COMPARISON: None available  FINDINGS:   Head CT:  No acute intracranial hemorrhage, midline shift or mass effect. Bilateral basal ganglia  mineralization is present. No acute territorial infarct is identified. Encephalomalacia/old infarct changes apparent in the anterior left frontal lobe.  Moderate periventricular hypodensities suggest chronic small vessel ischemic change in a patient this age.    Ventricles, cisterns, cerebral sulci are unremarkable for patient age.  The frontal sinuses are hypoplastic. The visualized paranasal sinuses, orbits, mastoid air cells are otherwise unremarkable.    Cervical spine CT:  No acute fracture is identified.  Mild retrolisthesis of C3 on C4. Mild anterolisthesis of C4 on C5. Mild retrolisthesis of C5 on C6.  Facet and uncovertebral joint hypertrophy contribute to neuroforaminal narrowing, more prominent on the right at C5/6, and on the left at C4/5, C5/6. Disc osteophyte complex appears to result in mild central stenosis at C2/3, C3/4, C5/6.  Bilateral cervical arterial calcifications are present. Small foci of soft tissue gas in the left aspect of the neck, for example axial image 152, 120, probably related to patient's recent in carotid endarterectomy procedure, correlate clinically to exclude any possibility of infectious process (no discrete drainable fluid collection is identified). Minimal fibronodular changes at the lung apices. Degenerative changes conspicuous at the right temporomandibular joint.       No acute intracranial hemorrhage or hydrocephalus; chronic changes of the brain. No acute cervical fracture identified; degenerative changes in the cervical spine. If there is further clinical concern, MRI could be considered for further evaluation.  Small nonspecific foci of soft tissue gas in the left aspect of the neck, as described.  This report was finalized on 11/6/2021 6:48 AM by Dr. Shai Dotson M.D.        I ordered the above noted radiological studies. Reviewed by me and discussed with radiologist.  See dictation for official radiology interpretation.      MEDICATIONS GIVEN IN  ER    Medications - No data to display      PROGRESS, DATA ANALYSIS, CONSULTS, AND MEDICAL DECISION MAKING    All labs have been independently reviewed by me.  All radiology studies have been reviewed by me and discussed with radiologist dictating the report.   EKG's independently viewed and interpreted by me.  Discussion below represents my analysis of pertinent findings related to patient's condition, differential diagnosis, treatment plan and final disposition.    I have discussed case with Dr. Little, emergency room physician.  He has performed his own bedside examination and agrees with treatment plan.    ED Course as of 11/06/21 1254   Sat Nov 06, 2021   0612 Patient presents for nursing home with reports of unwitnessed fall.  Patient anticoagulated on Eliquis.  Skin tears to right forearm.  Plan to rule out intracranial hemorrhage with CT of the head and cervical spine. [EE]   0713 Patient has a negative head CT.  No evidence of infection to the left carotid endarterectomy.  He has stable vitals.  Plan to discharge after dressing right forearm skin tears. [EE]      ED Course User Index  [EE] Jd Campbell PA       AS OF 12:54 EDT VITALS:    BP - 142/82  HR - 72  TEMP - 97.3 °F (36.3 °C) (Temporal)  O2 SATS - 99%        DIAGNOSIS  Final diagnoses:   Fall, initial encounter   Skin tear of right forearm without complication, initial encounter   Injury of head, initial encounter   Anticoagulated         DISPOSITION  Discharged           Jd Campbell PA  11/06/21 1254

## 2021-11-06 NOTE — ED NOTES
Pt to triage from [facility] via [place] [phone] with c/o unwitnessed fall from bed. NH staff told ems pt didn't hit head.  Pt history of previous stroke with residual right sided deficit.  Pt does not answer, but will not head.  Pt is on eliquis. Pt has skin tears to right distal arm.  Pt wearing mask in triage. Triage personnel wore appropriate PPE       Joslyn Duque RN  11/06/21 2199

## 2021-11-06 NOTE — ED NOTES
Appropriate PPE was performed with hand hygiene before and after pt care. Myself greeted pt and explained that wound care would be performed on right forearm due to skin tear. Cleaned wound with saline and applied antibiotic with a protective bandage barrier with ace wrap. Pts needs were met after care.     Radha Bryant, PCT  11/06/21 0807

## 2021-11-06 NOTE — ED NOTES
Received call back from EMS on pt transport to facility. ETA 30 mins.      Shanell Arizmendi, RN  11/06/21 0886

## 2021-11-06 NOTE — ED PROVIDER NOTES
MD ATTESTATION NOTE  Patient was placed in face mask in first look and the following protective measures were taken unless documented below in the ED course. Patient was wearing facemask when I entered the room and throughout our encounter. I wore full protective equipment throughout this patient encounter including a N95 face mask, googles, gown and gloves. Hand hygiene was performed before donning protective equipment and after removal when leaving the room.    The JULIANO and I have discussed this patient's history, physical exam, and treatment plan. I have reviewed the documentation and personally had a face to face interaction with the patient. I affirm the JULIANO documentation and agree with their diagnostics, findings, treatment, plan, and disposition.  The attached note describes my personal findings.    Suhail Springer is a 85 y.o. male who presents to the ED c/o fall.  Patient is aphasic, history limited.  Per report, patient had unwitnessed fall this morning.  Patient sustained a skin tears on his right arm.  Patient is able to nod his head yes and no.  Patient unsure if he struck his head, denies any headache.  Patient denies any neck pain or new weakness, no back pain, no difficulty breathing.    On exam:  General: NAD.  Head: NCAT.  ENT: EOMI, PERRLA, MMM.  Neck: Supple, trachea midline.  Surgical wound on left neck, clean/dry/intact, no signs of infection.  Cervical spine: No step-offs or deformities, no midline tenderness, no paraspinal tenderness.  Cardiac: regular rate and rhythm.  Lungs: CTAB.  Abdomen: Soft, NTTP, no rebound tenderness/guarding/rigidity.   : Deferred to JULIANO.  Extremities: Moves all extremities well, no peripheral edema  Neuro: Alert, aphasic, responds by nodding his head yes or no, follows commands, chronic right-sided weakness, sensation intact light touch  Skin: Warm, dry.  Skin tears on right forearm    Medical Decision Making:  After the initial H&P, I discussed pertinent  information from history and physical exam with patient/family.  Discussed differential diagnosis.  Discussed plan for ED evaluation/work-up/treatment.  All questions answered.  Patient/family is agreeable with plan.    ED Course as of 11/06/21 0943   Sat Nov 06, 2021   0612 Patient presents for nursing home with reports of unwitnessed fall.  Patient anticoagulated on Eliquis.  Skin tears to right forearm.  Plan to rule out intracranial hemorrhage with CT of the head and cervical spine. [EE]   0713 Patient has a negative head CT.  No evidence of infection to the left carotid endarterectomy.  He has stable vitals.  Plan to discharge after dressing right forearm skin tears. [EE]      ED Course User Index  [EE] Jd Campbell, PA       Diagnosis  Final diagnoses:   Fall, initial encounter   Skin tear of right forearm without complication, initial encounter   Injury of head, initial encounter   Anticoagulated        Basil Little MD  11/06/21 0918

## 2021-11-14 PROCEDURE — 99283 EMERGENCY DEPT VISIT LOW MDM: CPT

## 2021-11-14 PROCEDURE — 99284 EMERGENCY DEPT VISIT MOD MDM: CPT

## 2021-11-15 ENCOUNTER — APPOINTMENT (OUTPATIENT)
Dept: CT IMAGING | Facility: HOSPITAL | Age: 86
End: 2021-11-15

## 2021-11-15 ENCOUNTER — HOSPITAL ENCOUNTER (INPATIENT)
Facility: HOSPITAL | Age: 86
LOS: 3 days | Discharge: NURSING, REHAB OR LONG-TERM CARE | End: 2021-11-18
Attending: EMERGENCY MEDICINE | Admitting: INTERNAL MEDICINE

## 2021-11-15 DIAGNOSIS — D64.9 ANEMIA, UNSPECIFIED TYPE: ICD-10-CM

## 2021-11-15 DIAGNOSIS — S32.401A CLOSED NONDISPLACED FRACTURE OF RIGHT ACETABULUM, UNSPECIFIED PORTION OF ACETABULUM, INITIAL ENCOUNTER (HCC): ICD-10-CM

## 2021-11-15 DIAGNOSIS — S51.811A SKIN TEAR OF RIGHT FOREARM WITHOUT COMPLICATION, INITIAL ENCOUNTER: ICD-10-CM
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
## 2021-11-15 DIAGNOSIS — S32.591A CLOSED FRACTURE OF RIGHT INFERIOR PUBIC RAMUS, INITIAL ENCOUNTER (HCC): ICD-10-CM

## 2021-11-15 DIAGNOSIS — I48.91 ATRIAL FIBRILLATION, UNSPECIFIED TYPE (HCC): ICD-10-CM

## 2021-11-15 DIAGNOSIS — Z86.73 HISTORY OF CVA (CEREBROVASCULAR ACCIDENT): ICD-10-CM

## 2021-11-15 DIAGNOSIS — N18.9 CHRONIC KIDNEY DISEASE, UNSPECIFIED CKD STAGE: ICD-10-CM

## 2021-11-15 DIAGNOSIS — Z79.01 ANTICOAGULATED: ICD-10-CM

## 2021-11-15 DIAGNOSIS — E78.5 HYPERLIPIDEMIA, UNSPECIFIED HYPERLIPIDEMIA TYPE: ICD-10-CM

## 2021-11-15 DIAGNOSIS — S70.01XA CONTUSION OF RIGHT HIP, INITIAL ENCOUNTER: ICD-10-CM

## 2021-11-15 DIAGNOSIS — I10 PRIMARY HYPERTENSION: ICD-10-CM
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
## 2021-11-15 PROBLEM — N18.30 CKD (CHRONIC KIDNEY DISEASE) STAGE 3, GFR 30-59 ML/MIN: Status: ACTIVE | Noted: 2021-11-15

## 2021-11-15 PROBLEM — K21.9 GERD (GASTROESOPHAGEAL REFLUX DISEASE): Status: ACTIVE | Noted: 2021-11-15

## 2021-11-15 PROBLEM — N18.32 STAGE 3B CHRONIC KIDNEY DISEASE: Status: ACTIVE | Noted: 2021-11-15

## 2021-11-15 PROBLEM — S32.82XA MULTIPLE PELVIC FRACTURES (HCC): Status: ACTIVE | Noted: 2021-11-15

## 2021-11-15 LAB
ANION GAP SERPL CALCULATED.3IONS-SCNC: 11.6 MMOL/L (ref 5–15)
ANION GAP SERPL CALCULATED.3IONS-SCNC: 12.3 MMOL/L (ref 5–15)
APTT PPP: 37.2 SECONDS (ref 22.7–35.4)
BASOPHILS # BLD AUTO: 0.04 10*3/MM3 (ref 0–0.2)
BASOPHILS NFR BLD AUTO: 0.4 % (ref 0–1.5)
BUN SERPL-MCNC: 19 MG/DL (ref 8–23)
BUN SERPL-MCNC: 19 MG/DL (ref 8–23)
BUN/CREAT SERPL: 9.3 (ref 7–25)
BUN/CREAT SERPL: 9.9 (ref 7–25)
CALCIUM SPEC-SCNC: 8.7 MG/DL (ref 8.6–10.5)
CALCIUM SPEC-SCNC: 8.8 MG/DL (ref 8.6–10.5)
CHLORIDE SERPL-SCNC: 103 MMOL/L (ref 98–107)
CHLORIDE SERPL-SCNC: 104 MMOL/L (ref 98–107)
CO2 SERPL-SCNC: 23.7 MMOL/L (ref 22–29)
CO2 SERPL-SCNC: 25.4 MMOL/L (ref 22–29)
CREAT SERPL-MCNC: 1.92 MG/DL (ref 0.76–1.27)
CREAT SERPL-MCNC: 2.04 MG/DL (ref 0.76–1.27)
DEPRECATED RDW RBC AUTO: 41 FL (ref 37–54)
DEPRECATED RDW RBC AUTO: 44.1 FL (ref 37–54)
EOSINOPHIL # BLD AUTO: 0.29 10*3/MM3 (ref 0–0.4)
EOSINOPHIL NFR BLD AUTO: 3.2 % (ref 0.3–6.2)
ERYTHROCYTE [DISTWIDTH] IN BLOOD BY AUTOMATED COUNT: 12.5 % (ref 12.3–15.4)
ERYTHROCYTE [DISTWIDTH] IN BLOOD BY AUTOMATED COUNT: 12.5 % (ref 12.3–15.4)
GFR SERPL CREATININE-BSD FRML MDRD: 31 ML/MIN/1.73
GFR SERPL CREATININE-BSD FRML MDRD: 34 ML/MIN/1.73
GLUCOSE SERPL-MCNC: 113 MG/DL (ref 65–99)
GLUCOSE SERPL-MCNC: 119 MG/DL (ref 65–99)
HCT VFR BLD AUTO: 29 % (ref 37.5–51)
HCT VFR BLD AUTO: 29 % (ref 37.5–51)
HCT VFR BLD AUTO: 31.2 % (ref 37.5–51)
HGB BLD-MCNC: 10.3 G/DL (ref 13–17.7)
HGB BLD-MCNC: 10.3 G/DL (ref 13–17.7)
HGB BLD-MCNC: 10.7 G/DL (ref 13–17.7)
IMM GRANULOCYTES # BLD AUTO: 0.02 10*3/MM3 (ref 0–0.05)
IMM GRANULOCYTES NFR BLD AUTO: 0.2 % (ref 0–0.5)
INR PPP: 1.75 (ref 0.9–1.1)
LYMPHOCYTES # BLD AUTO: 2.62 10*3/MM3 (ref 0.7–3.1)
LYMPHOCYTES NFR BLD AUTO: 29.3 % (ref 19.6–45.3)
MCH RBC QN AUTO: 32.7 PG (ref 26.6–33)
MCH RBC QN AUTO: 32.9 PG (ref 26.6–33)
MCHC RBC AUTO-ENTMCNC: 34.3 G/DL (ref 31.5–35.7)
MCHC RBC AUTO-ENTMCNC: 35.5 G/DL (ref 31.5–35.7)
MCV RBC AUTO: 92.1 FL (ref 79–97)
MCV RBC AUTO: 96 FL (ref 79–97)
MONOCYTES # BLD AUTO: 0.7 10*3/MM3 (ref 0.1–0.9)
MONOCYTES NFR BLD AUTO: 7.8 % (ref 5–12)
NEUTROPHILS NFR BLD AUTO: 5.28 10*3/MM3 (ref 1.7–7)
NEUTROPHILS NFR BLD AUTO: 59.1 % (ref 42.7–76)
NRBC BLD AUTO-RTO: 0 /100 WBC (ref 0–0.2)
PLATELET # BLD AUTO: 160 10*3/MM3 (ref 140–450)
PLATELET # BLD AUTO: 183 10*3/MM3 (ref 140–450)
PMV BLD AUTO: 9.7 FL (ref 6–12)
PMV BLD AUTO: 9.8 FL (ref 6–12)
POTASSIUM SERPL-SCNC: 3.8 MMOL/L (ref 3.5–5.2)
POTASSIUM SERPL-SCNC: 3.9 MMOL/L (ref 3.5–5.2)
PROTHROMBIN TIME: 20.2 SECONDS (ref 11.7–14.2)
RBC # BLD AUTO: 3.15 10*6/MM3 (ref 4.14–5.8)
RBC # BLD AUTO: 3.25 10*6/MM3 (ref 4.14–5.8)
SARS-COV-2 RNA PNL SPEC NAA+PROBE: NOT DETECTED
SODIUM SERPL-SCNC: 139 MMOL/L (ref 136–145)
SODIUM SERPL-SCNC: 141 MMOL/L (ref 136–145)
WBC # BLD AUTO: 7.76 10*3/MM3 (ref 3.4–10.8)
WBC # BLD AUTO: 8.95 10*3/MM3 (ref 3.4–10.8)

## 2021-11-15 PROCEDURE — 80048 BASIC METABOLIC PNL TOTAL CA: CPT | Performed by: NURSE PRACTITIONER

## 2021-11-15 PROCEDURE — 85730 THROMBOPLASTIN TIME PARTIAL: CPT | Performed by: PHYSICIAN ASSISTANT

## 2021-11-15 PROCEDURE — 80048 BASIC METABOLIC PNL TOTAL CA: CPT | Performed by: PHYSICIAN ASSISTANT

## 2021-11-15 PROCEDURE — 85014 HEMATOCRIT: CPT | Performed by: NURSE PRACTITIONER

## 2021-11-15 PROCEDURE — 85018 HEMOGLOBIN: CPT | Performed by: NURSE PRACTITIONER

## 2021-11-15 PROCEDURE — 85025 COMPLETE CBC W/AUTO DIFF WBC: CPT | Performed by: PHYSICIAN ASSISTANT

## 2021-11-15 PROCEDURE — 87635 SARS-COV-2 COVID-19 AMP PRB: CPT | Performed by: INTERNAL MEDICINE

## 2021-11-15 PROCEDURE — 72192 CT PELVIS W/O DYE: CPT

## 2021-11-15 PROCEDURE — 36415 COLL VENOUS BLD VENIPUNCTURE: CPT | Performed by: NURSE PRACTITIONER

## 2021-11-15 PROCEDURE — 85027 COMPLETE CBC AUTOMATED: CPT | Performed by: NURSE PRACTITIONER

## 2021-11-15 PROCEDURE — 85610 PROTHROMBIN TIME: CPT | Performed by: PHYSICIAN ASSISTANT

## 2021-11-15 RX ORDER — POLYETHYLENE GLYCOL 3350 17 G/17G
17 POWDER, FOR SOLUTION ORAL 3 TIMES DAILY
Status: DISCONTINUED | OUTPATIENT
Start: 2021-11-15 | End: 2021-11-18 | Stop reason: HOSPADM

## 2021-11-15 RX ORDER — CALCIUM CARBONATE 200(500)MG
2 TABLET,CHEWABLE ORAL 2 TIMES DAILY PRN
Status: DISCONTINUED | OUTPATIENT
Start: 2021-11-15 | End: 2021-11-18 | Stop reason: HOSPADM

## 2021-11-15 RX ORDER — METOPROLOL SUCCINATE 50 MG/1
50 TABLET, EXTENDED RELEASE ORAL 2 TIMES DAILY
Status: DISCONTINUED | OUTPATIENT
Start: 2021-11-15 | End: 2021-11-18 | Stop reason: HOSPADM

## 2021-11-15 RX ORDER — ATORVASTATIN CALCIUM 20 MG/1
40 TABLET, FILM COATED ORAL NIGHTLY
Status: DISCONTINUED | OUTPATIENT
Start: 2021-11-15 | End: 2021-11-18 | Stop reason: HOSPADM

## 2021-11-15 RX ORDER — CHOLECALCIFEROL (VITAMIN D3) 125 MCG
500 CAPSULE ORAL DAILY
Status: DISCONTINUED | OUTPATIENT
Start: 2021-11-15 | End: 2021-11-18 | Stop reason: HOSPADM

## 2021-11-15 RX ORDER — ACETAMINOPHEN 325 MG/1
650 TABLET ORAL EVERY 4 HOURS PRN
Status: DISCONTINUED | OUTPATIENT
Start: 2021-11-15 | End: 2021-11-18 | Stop reason: HOSPADM

## 2021-11-15 RX ORDER — BISACODYL 10 MG
10 SUPPOSITORY, RECTAL RECTAL DAILY
Status: DISCONTINUED | OUTPATIENT
Start: 2021-11-15 | End: 2021-11-18 | Stop reason: HOSPADM

## 2021-11-15 RX ORDER — FLUTICASONE PROPIONATE 50 MCG
1 SPRAY, SUSPENSION (ML) NASAL DAILY PRN
Status: DISCONTINUED | OUTPATIENT
Start: 2021-11-15 | End: 2021-11-18 | Stop reason: HOSPADM

## 2021-11-15 RX ORDER — AMOXICILLIN 250 MG
2 CAPSULE ORAL 2 TIMES DAILY
Status: DISCONTINUED | OUTPATIENT
Start: 2021-11-15 | End: 2021-11-18 | Stop reason: HOSPADM

## 2021-11-15 RX ORDER — SODIUM CHLORIDE 0.9 % (FLUSH) 0.9 %
10 SYRINGE (ML) INJECTION AS NEEDED
Status: DISCONTINUED | OUTPATIENT
Start: 2021-11-15 | End: 2021-11-18 | Stop reason: HOSPADM

## 2021-11-15 RX ORDER — PANTOPRAZOLE SODIUM 40 MG/1
40 TABLET, DELAYED RELEASE ORAL EVERY MORNING
Status: DISCONTINUED | OUTPATIENT
Start: 2021-11-15 | End: 2021-11-18 | Stop reason: HOSPADM

## 2021-11-15 RX ORDER — SODIUM CHLORIDE 0.9 % (FLUSH) 0.9 %
10 SYRINGE (ML) INJECTION EVERY 12 HOURS SCHEDULED
Status: DISCONTINUED | OUTPATIENT
Start: 2021-11-15 | End: 2021-11-18 | Stop reason: HOSPADM

## 2021-11-15 RX ORDER — ONDANSETRON 2 MG/ML
4 INJECTION INTRAMUSCULAR; INTRAVENOUS EVERY 6 HOURS PRN
Status: DISCONTINUED | OUTPATIENT
Start: 2021-11-15 | End: 2021-11-18 | Stop reason: HOSPADM

## 2021-11-15 RX ORDER — HYDROCODONE BITARTRATE AND ACETAMINOPHEN 5; 325 MG/1; MG/1
1 TABLET ORAL EVERY 6 HOURS PRN
Status: DISCONTINUED | OUTPATIENT
Start: 2021-11-15 | End: 2021-11-18 | Stop reason: HOSPADM

## 2021-11-15 RX ORDER — ONDANSETRON 4 MG/1
4 TABLET, FILM COATED ORAL EVERY 6 HOURS PRN
Status: DISCONTINUED | OUTPATIENT
Start: 2021-11-15 | End: 2021-11-18 | Stop reason: HOSPADM

## 2021-11-15 RX ADMIN — Medication 500 MCG: at 13:40

## 2021-11-15 RX ADMIN — HYDROCODONE BITARTRATE AND ACETAMINOPHEN 1 TABLET: 5; 325 TABLET ORAL at 20:25

## 2021-11-15 RX ADMIN — METOPROLOL SUCCINATE 50 MG: 50 TABLET, EXTENDED RELEASE ORAL at 13:42

## 2021-11-15 RX ADMIN — SODIUM CHLORIDE, PRESERVATIVE FREE 10 ML: 5 INJECTION INTRAVENOUS at 10:29

## 2021-11-15 RX ADMIN — PANTOPRAZOLE SODIUM 40 MG: 40 TABLET, DELAYED RELEASE ORAL at 13:38

## 2021-11-15 RX ADMIN — DOCUSATE SODIUM 50MG AND SENNOSIDES 8.6MG 2 TABLET: 8.6; 5 TABLET, FILM COATED ORAL at 13:38

## 2021-11-15 RX ADMIN — METOPROLOL SUCCINATE 50 MG: 50 TABLET, EXTENDED RELEASE ORAL at 20:25

## 2021-11-15 RX ADMIN — ACETAMINOPHEN 650 MG: 325 TABLET, FILM COATED ORAL at 18:57

## 2021-11-15 RX ADMIN — ATORVASTATIN CALCIUM 40 MG: 20 TABLET, FILM COATED ORAL at 20:25

## 2021-11-15 NOTE — CASE MANAGEMENT/SOCIAL WORK
Discharge Planning Assessment  [facility]     Patient Name: Suhail Springer  MRN: [id number]  Today's Date: 11/15/2021    Admit Date: 11/15/2021     Discharge Needs Assessment     Row Name 11/15/21 1102       Living Environment    Lives With facility resident    Current Living Arrangements extended care facility    Provides Primary Care For no one    Family Caregiver if Needed child(allyssa), adult    Quality of Family Relationships helpful; involved    Able to Return to Prior Arrangements yes    Living Arrangement Comments skilled care at [facility]       Resource/Environmental Concerns    Resource/Environmental Concerns none       Transition Planning    Patient/Family Anticipates Transition to inpatient rehabilitation facility    Patient/Family Anticipated Services at Transition skilled nursing       Discharge Needs Assessment    Current Outpatient/Agency/Support Group skilled nursing facility    Equipment Currently Used at Home wheelchair; walker, rolling    Equipment Needed After Discharge none    Outpatient/Agency/Support Group Needs skilled nursing facility    Discharge Facility/Level of Care Needs nursing facility, skilled    Provided Post Acute Provider List? N/A    N/A Provider List Comment plans back to [facility]               Discharge Plan     Row Name 11/15/21 1101       Plan    Plan [facility]; skilled    Patient/Family in Agreement with Plan yes    Plan Comments Spoke with Elvia/Omkar, pt is from skilled unit at [facility] and can return. She will reach out to family regarding bed hold. Spoke with pt’s son Jessee via phone. He reports he would like for pt to return to [facility] for rehab. Ortho consulted. Packet in CCP office. CCP to follow. SELENA Do              Continued Care and Services - Admitted Since 11/15/2021     Destination     Service Provider Request Status Selected Services Address Phone Fax Patient Preferred    [facility]  Pending - Request Sent N/A  [address] [facility] [address] [phone] --                 Demographic Summary     Row Name 11/15/21 1102       General Information    Admission Type observation    Arrived From home    Required Notices Provided Observation Status Notice    Reason for Consult discharge planning    Preferred Language English     Used During This Interaction no               Functional Status    No documentation.                Psychosocial    No documentation.                Abuse/Neglect    No documentation.                Legal    No documentation.                Substance Abuse    No documentation.                Patient Forms    No documentation.                   LENA Bach

## 2021-11-15 NOTE — ED PROVIDER NOTES
EMERGENCY DEPARTMENT ENCOUNTER    Room Number:  07/07  Date of encounter:  11/15/2021  PCP: Basilio Diaz MD  Historian: Patient      HPI:  Chief Complaint: Hip    A complete HPI/ROS/PMH/PSH/SH/FH are unobtainable due to: History of CVA    Context: Suhail Springer is a 85 y.o. male with past medical history of AF on Eliquis, prior CVA, HTN, HLD, and CKD who presents to the ED c/o fall.  Patient was sent to the ER for evaluation from the nursing home after a fall with injury to the right arm and right hip/buttock.  Patient was noted to have a large amount of bruising over his right lateral hip/buttock, they obtained out patient x-rays which were negative for any acute fracture, however he was still sent to the ER for further evaluation.      PAST MEDICAL HISTORY  Active Ambulatory Problems     Diagnosis Date Noted   • Cerebrovascular accident (CVA) due to stenosis of precerebral artery (HCC) 10/28/2021   • Stenosis of left carotid artery 10/28/2021     Resolved Ambulatory Problems     Diagnosis Date Noted   • No Resolved Ambulatory Problems     Past Medical History:   Diagnosis Date   • A-fib (HCC)    • Arthritis    • Cancer (HCC)    • Carotid stenosis    • CKD (chronic kidney disease)    • CVA, old, aphasia 10/07/2021   • Hyperlipidemia    • Hypertension    • Pacemaker    • Sleep apnea    • Stroke (HCC)          PAST SURGICAL HISTORY  Past Surgical History:   Procedure Laterality Date   • CAROTID ENDARTERECTOMY Left 11/3/2021    Procedure: LEFT CAROTID ENDARTERECTOMY WITH SHUNT, BOVINE PATCH;  Surgeon: Olegario Hall MD;  Location: [facility];  Service: Vascular;  Laterality: Left;   • EXCISION LESION      PRE CANCERIOUS LESION REMOVED         FAMILY HISTORY  No family history on file.      SOCIAL HISTORY  Social History     Socioeconomic History   • Marital status:    Tobacco Use   • Smoking status: Former Smoker     Types: Cigarettes   • Smokeless tobacco: Never Used   • Tobacco comment: QUIT 40  YEARS AGO   Vaping Use   • Vaping Use: Never used   Substance and Sexual Activity   • Alcohol use: Never   • Drug use: Never   • Sexual activity: Defer         ALLERGIES  Pollen extract        REVIEW OF SYSTEMS  Review of Systems   Unable to obtain secondary to patient condition.       PHYSICAL EXAM    I have reviewed the triage vital signs and nursing notes.    ED Triage Vitals [11/14/21 2344]   Temp Heart Rate Resp BP SpO2   98.3 °F (36.8 °C) 102 18 125/88 96 %      Temp src Heart Rate Source Patient Position BP Location FiO2 (%)   Tympanic Monitor Lying Right arm --       Physical Exam  GENERAL: Alert, not distressed  HENT: nares patent, no hemotympanum, head atraumatic/normocephalic  EYES: no scleral icterus, PERRL, EOMI  CV: regular rhythm, regular rate, intact pedal pulses  RESPIRATORY: normal effort  ABDOMEN: soft/nontender  MUSCULOSKELETAL: no deformity, soft tissue swelling and tenderness over the right lateral right hip  NEURO: alert, moves all extremities, follows commands  SKIN: warm, dry, skin tear to the right forearm, ecchymosis to the right buttock        LAB RESULTS  Recent Results (from the past 24 hour(s))   Basic Metabolic Panel    Collection Time: 11/15/21 12:51 AM    Specimen: Blood   Result Value Ref Range    Glucose 119 (H) 65 - 99 mg/dL    BUN 19 8 - 23 mg/dL    Creatinine 2.04 (H) 0.76 - 1.27 mg/dL    Sodium 141 136 - 145 mmol/L    Potassium 3.9 3.5 - 5.2 mmol/L    Chloride 104 98 - 107 mmol/L    CO2 25.4 22.0 - 29.0 mmol/L    Calcium 8.7 8.6 - 10.5 mg/dL    eGFR Non African Amer 31 (L) >60 mL/min/1.73    BUN/Creatinine Ratio 9.3 7.0 - 25.0    Anion Gap 11.6 5.0 - 15.0 mmol/L   Protime-INR    Collection Time: 11/15/21 12:51 AM    Specimen: Blood   Result Value Ref Range    Protime 20.2 (H) 11.7 - 14.2 Seconds    INR 1.75 (H) 0.90 - 1.10   aPTT    Collection Time: 11/15/21 12:51 AM    Specimen: Blood   Result Value Ref Range    PTT 37.2 (H) 22.7 - 35.4 seconds   CBC Auto Differential     Collection Time: 11/15/21 12:51 AM    Specimen: Blood   Result Value Ref Range    WBC 8.95 3.40 - 10.80 10*3/mm3    RBC 3.25 (L) 4.14 - 5.80 10*6/mm3    Hemoglobin 10.7 (L) 13.0 - 17.7 g/dL    Hematocrit 31.2 (L) 37.5 - 51.0 %    MCV 96.0 79.0 - 97.0 fL    MCH 32.9 26.6 - 33.0 pg    MCHC 34.3 31.5 - 35.7 g/dL    RDW 12.5 12.3 - 15.4 %    RDW-SD 44.1 37.0 - 54.0 fl    MPV 9.7 6.0 - 12.0 fL    Platelets 183 140 - 450 10*3/mm3    Neutrophil % 59.1 42.7 - 76.0 %    Lymphocyte % 29.3 19.6 - 45.3 %    Monocyte % 7.8 5.0 - 12.0 %    Eosinophil % 3.2 0.3 - 6.2 %    Basophil % 0.4 0.0 - 1.5 %    Immature Grans % 0.2 0.0 - 0.5 %    Neutrophils, Absolute 5.28 1.70 - 7.00 10*3/mm3    Lymphocytes, Absolute 2.62 0.70 - 3.10 10*3/mm3    Monocytes, Absolute 0.70 0.10 - 0.90 10*3/mm3    Eosinophils, Absolute 0.29 0.00 - 0.40 10*3/mm3    Basophils, Absolute 0.04 0.00 - 0.20 10*3/mm3    Immature Grans, Absolute 0.02 0.00 - 0.05 10*3/mm3    nRBC 0.0 0.0 - 0.2 /100 WBC       Ordered the above labs and independently reviewed the results.        RADIOLOGY  CT Pelvis Without Contrast    Result Date: 11/15/2021  CT OF THE BONY PELVIS  HISTORY: Right hip pain after a fall  COMPARISON: None available.  TECHNIQUE: Axial CT imaging was obtained through the pelvis. Coronal and sagittal reformatted images were obtained.  FINDINGS: There is a nondisplaced fracture of the right sacral ala. There is a comminuted right inferior pubic ramus fracture. There is a fracture of the anterior wall of the right acetabulum, with extension into the right superior pubic ramus. No extension into the iliac wing is seen. Soft tissue windows demonstrate some hemorrhage along the right pelvic sidewall. Large amount of stool is seen within the rectum, which may reflect impaction. Some additional hemorrhage is noted overlying the right lateral thigh, and within the musculature adjacent to the right inferior pubic radius. No fractures are identified on the left. There  are changes of prior inguinal hernia repair with mesh.      There is a fracture of the right acetabulum, with extension into the right superior pubic ramus. Patient is also noted to have a comminuted fracture of the right inferior pubic ramus, as well as a fracture of the right sacral ala.  Radiation dose reduction techniques were utilized, including automated exposure control and exposure modulation based on body size.  This report was finalized on 11/15/2021 2:23 AM by Dr. Gaviota Bowen M.D.        I ordered the above noted radiological studies. Reviewed by me and discussed with radiologist.  See dictation for official radiology interpretation.      PROCEDURES    Procedures      MEDICATIONS GIVEN IN ER    Medications - No data to display      PROGRESS, DATA ANALYSIS, CONSULTS, AND MEDICAL DECISION MAKING    All labs have been independently reviewed by me.  All radiology studies have been reviewed by me and discussed with radiologist dictating the report.   EKG's independently viewed and interpreted by me.  Discussion below represents my analysis of pertinent findings related to patient's condition, differential diagnosis, treatment plan and final disposition.    DDx: Includes but not limited to hip fracture, femur fracture, pelvis fracture, contusion, hematoma    ED Course as of 11/15/21 0310   Mon Nov 15, 2021   0137 WBC: 8.95 [JOE]   0137 Hemoglobin(!): 10.7 [JOE]   0137 Hematocrit(!): 31.2 [JOE]   0137 Platelets: 183 [JOE]   0137 Glucose(!): 119 [JOE]   0137 BUN: 19 [JOE]   0137 Creatinine(!): 2.04 [JOE]   0137 Sodium: 141 [JOE]   0137 Potassium: 3.9 [JOE]   0150 Hemoglobin(!): 10.7  Patient's hemoglobin has dropped 1 unit from 11 days ago.  Previous hemoglobin was 11.6 [MM]   0151 Creatinine(!): 2.04  Chronic renal impairment [MM]   0151 INR(!): 1.75  Patient is on Eliquis [MM]   0306 Patient care discussed with Dara HEARD, will place in observation in a medical surge bed to Dr. Root. [JOE]      ED Course User  Index  [JOE] Farhan Malcolm PA  [MM] Júnior Prince MD       MDM: CT scan of the patient's pelvis shows right-sided acetabular, superior pubic, and inferior pubic rami fractures patient is unable to bear weight, he has had a drop in his hemoglobin over the last 3 weeks.  Will admit for further observation, to trend his hemoglobin, and consult orthopedic surgery.    PPE: The patient wore a surgical mask throughout the entire patient encounter. I wore an N95.    AS OF 03:10 EST VITALS:    BP - 123/78  HR - 73  TEMP - 98.3 °F (36.8 °C) (Tympanic)  O2 SATS - 94%        DIAGNOSIS  Final diagnoses:   Fall at nursing home, initial encounter   Contusion of right hip, initial encounter   Skin tear of right forearm without complication, initial encounter   Atrial fibrillation, unspecified type (HCC)   Primary hypertension   Hyperlipidemia, unspecified hyperlipidemia type   History of CVA (cerebrovascular accident)   Anticoagulated   Chronic kidney disease, unspecified CKD stage   Closed nondisplaced fracture of right acetabulum, unspecified portion of acetabulum, initial encounter (HCC)   Closed fracture of right inferior pubic ramus, initial encounter (HCC)   Anemia, unspecified type         DISPOSITION  ADMISSION    Discussed treatment plan and reason for admission with pt/family and admitting physician.  Pt/family voiced understanding of the plan for admission for further testing/treatment as needed.                  Farhan Malcolm PA  11/15/21 2071

## 2021-11-15 NOTE — DISCHARGE PLACEMENT REQUEST
"Racheal, Suhail (85 y.o. Male)             Date of Birth Social Security Number Address Home Phone MRN    [id number]  [id number] [facility] [phone] [id number]    Yazidi Marital Status             Evangelical        Admission Date Admission Type Admitting Provider Attending Provider Department, Room/Bed    11/15/21 Emergency Danie Castle MD Masden, Troy Andrew, MD [address], P387/1    Discharge Date Discharge Disposition Discharge Destination                         Attending Provider: Danie Castle MD    Allergies: Pollen Extract    Isolation: None   Infection: None   Code Status: CPR   Advance Care Planning Activity    Ht: 170.2 cm (67\")   Wt: 73 kg (161 lb)    Admission Cmt: None   Principal Problem: None                Active Insurance as of 11/15/2021     Primary Coverage     Payor Plan Insurance Group Employer/Plan Group    MEDICARE MEDICARE A & B      Payor Plan Address Payor Plan Phone Number Payor Plan Fax Number Effective Dates    [address] [phone]  12/1/2002 - None Entered    [facility] [id number]       Subscriber Name Subscriber Birth Date Member ID       SUHAIL HERRING 1935 [id number]           Secondary Coverage     Payor Plan Insurance Group Employer/Plan Group    [facility] SUPP [id number]     Payor Plan Address Payor Plan Phone Number Payor Plan Fax Number Effective Dates    [address]   12/1/2016 - None Entered    [facility] [id number]       Subscriber Name Subscriber Birth Date Member ID       SUHAIL HERRING 1935 [id number]                 Emergency Contacts      (Rel.) Home Phone Work Phone Mobile Phone    RACHEAL, DAN (Son) [phone] -- --    RachealGracie (Spouse) [phone] -- [phone]          "

## 2021-11-15 NOTE — ED NOTES
Pt was in mask through out encounter. This ERT was in proper PPE through out encounter.      Mesha Barnes, PCT  11/15/21 0349

## 2021-11-15 NOTE — NURSING NOTE
Pt has very large bruise on his right hip, gave tylenol for pain.  Turns q 2, provided urinal,  he has had several loose BM's and has on clean brief. Pt is disoriented but follows commands.  Eliquis for a fib and wearing SCD's.  VSS, will continue to monitor.

## 2021-11-15 NOTE — ED NOTES
Pt arrived by EMS from [facility]. Pt fall today at 0600 and this afternoon at unknown time. Pt has bruising right hip and swelling, X-ray at NH is negative.     Patient was placed in face mask during first look triage.  Patient was wearing a face mask throughout encounter.  I wore personal protective equipment throughout the encounter.  Hand hygiene was performed before and after patient encounter.        Dinah Alexander RN  11/14/21 3982

## 2021-11-15 NOTE — ED PROVIDER NOTES
I supervised care provided by the midlevel provider.   We have discussed this patient's history, physical exam, and treatment plan.  I have reviewed the note and personally saw and examined the patient and agree with the plan of care.   Patient was sent from nursing home for evaluation after a fall.  Patient had a bruise to his right hip and buttocks region.  Had outpatient x-rays prior to arrival here which were negative.  Still sent here to the emergency department.  Patient does have a history of atrial fibrillation and is on Eliquis.  He also has a history of CVAs, hypertension hyperlipidemia, and chronic kidney disease.  Patient is unable to provide a history of the events prior to arrival here.  According to nursing home in the conversation I had with Farhan Thomas the physician assistant this is patient's baseline    GENERAL: Elderly male that is frail and chronically ill-appearing.  Not distressed  HENT: nares patent  Head/neck/ face are symmetric without gross deformity or swelling  EYES: no scleral icterus  CV: regular rhythm, regular rate with intact distal pulses  RESPIRATORY: normal effort and no respiratory distress  ABDOMEN: soft and nontender  MUSCULOSKELETAL: Patient has some bruising and ecchymosis that is mild over the right lateral hip and greater trochanteric region.  Patient has some tenderness to palpation to the right buttocks.  Has tenderness with elevation of the right leg.  His leg is not shortened or externally rotated.  No obvious pain with external/internal rotation of hips bilaterally.  NEURO: alert to name.  Disoriented to age and time.  No focal motor or sensory changes.  SKIN: warm, dry    Vital signs and nursing notes reviewed.    Plan I reviewed patient's lab work.  I have also reviewed the CT scan report.     We are currently under a pandemic from the COVID19 infection.  The patient presented to the emergency department by ambulance or personal vehicle. I followed the current  protocols required by Infection Control at [facility] in my evaluation and treatment of the patient. The patient was wearing a face mask during my evaluation and throughout my encounter. During my whole encounter with this patient I used appropriate personal protective equipment.  This equipment consisted of eye protection, facemask, gown, and gloves.  I applied this equipment before entering the room.    ED Course as of 11/16/21 0153   Mon Nov 15, 2021   0137 WBC: 8.95 [JOE]   0137 Hemoglobin(!): 10.7 [JOE]   0137 Hematocrit(!): 31.2 [JOE]   0137 Platelets: 183 [JOE]   0137 Glucose(!): 119 [JOE]   0137 BUN: 19 [JOE]   0137 Creatinine(!): 2.04 [JOE]   0137 Sodium: 141 [JOE]   0137 Potassium: 3.9 [JOE]   0150 Hemoglobin(!): 10.7  Patient's hemoglobin has dropped 1 unit from 11 days ago.  Previous hemoglobin was 11.6 [MM]   0151 Creatinine(!): 2.04  Chronic renal impairment [MM]   0151 INR(!): 1.75  Patient is on Eliquis [MM]   0306 Patient care discussed with Dara HEARD, will place in observation in a medical surge bed to Dr. Root. [JOE]      ED Course User Index  [JOE] Farhan Malcolm PA  [MM] Júnior Prince MD Molinari, Mark, MD  11/16/21 0153

## 2021-11-15 NOTE — H&P
Patient Name:  Suhail Springer  YOB: 1935  MRN:  [id number]  Admit Date:  11/15/2021  Patient Care Team:  Basilio Diaz MD as PCP - General (Family Medicine)      Subjective   History Present Illness     Chief Complaint   Patient presents with   • Hip Injury       Mr. Springer is a 85 y.o. former smoker with a history of dementia, atrial fibrillation on Eliquis, stroke, carotid stenosis, hypertension, hyperlipidemia, and CKD stage 3 that presents to [facility] complaining of a fall. The patient is a poor historian due to dementia, so history is obtained from medical records.  Per ED notes, the patient was sent from his nursing home following a fall and right hip injury.  The patient denies any acute complaints, but complains of pain when his right hip and pelvis are palpated.  A CT of the abdomen/pelvis revealed a fracture of the right acetabulum, with extension into the right superior pubic ramus.  Patient is also noted to have a comminuted fracture of the right inferior pubic ramus, as well as a fracture of the right sacral ala.      History of Present Illness  Review of Systems   Unable to perform ROS: Dementia        Personal History     Past Medical History:   Diagnosis Date   • A-fib (HCC)     ASYMPTOMATIC- SEE'S DR HASSAN- DENIED CP/SOB   • Arthritis    • Cancer (HCC)     SKIN CANCER LESION    • Carotid stenosis     STENOSIS OF LEFT CAROTID    • CKD (chronic kidney disease)     MONITORED BY PCP TWICE A YEAR   • CVA, old, aphasia 10/07/2021   • Hyperlipidemia    • Hypertension    • Pacemaker    • Sleep apnea     NO CPAP   • Stroke (HCC)     OCTOBER 7, 2021- WENT TO REHAB- DISCHARGED 11-1-21- GENERALIZED WEAKNESS      Past Surgical History:   Procedure Laterality Date   • CAROTID ENDARTERECTOMY Left 11/3/2021    Procedure: LEFT CAROTID ENDARTERECTOMY WITH SHUNT, BOVINE PATCH;  Surgeon: Olegario Hall MD;  [facility] MAIN OR;  Service: Vascular;  Laterality:  Left;   • EXCISION LESION      PRE CANCERIOUS LESION REMOVED     No family history on file.  Social History     Tobacco Use   • Smoking status: Former Smoker     Types: Cigarettes   • Smokeless tobacco: Never Used   • Tobacco comment: QUIT 40 YEARS AGO   Vaping Use   • Vaping Use: Never used   Substance Use Topics   • Alcohol use: Never   • Drug use: Never     (Not in a hospital admission)    Allergies:    Allergies   Allergen Reactions   • Pollen Extract Shortness Of Breath       Objective    Objective     Vital Signs  Temp:  [98.3 °F (36.8 °C)] 98.3 °F (36.8 °C)  Heart Rate:  [] 74  Resp:  [18] 18  BP: (123-152)/(78-88) 152/78  SpO2:  [94 %-98 %] 96 %  on   ;   Device (Oxygen Therapy): room air  There is no height or weight on file to calculate BMI.    Physical Exam  Vitals and nursing note reviewed.   Constitutional:       Appearance: Normal appearance.   HENT:      Head: Normocephalic and atraumatic.      Nose: Nose normal.      Mouth/Throat:      Mouth: Mucous membranes are dry.      Pharynx: Oropharynx is clear.   Eyes:      Extraocular Movements: Extraocular movements intact.      Conjunctiva/sclera: Conjunctivae normal.   Cardiovascular:      Rate and Rhythm: Normal rate and regular rhythm.      Pulses: Normal pulses.      Heart sounds: Murmur heard.       Pulmonary:      Effort: Pulmonary effort is normal.      Breath sounds: Normal breath sounds.   Abdominal:      General: Bowel sounds are normal. There is no distension.      Palpations: Abdomen is soft.      Tenderness: There is no abdominal tenderness.   Musculoskeletal:         General: Swelling (right lateral hip/buttock) and tenderness (right pelvis; right hip/buttock) present. Normal range of motion.      Cervical back: Normal range of motion and neck supple.      Comments: Moderate sized hematoma to lateral right hip/right buttock.   Skin:     General: Skin is warm and dry.      Findings: Bruising present.      Comments: Hematoma/ecchymosis of  right lateral hip/buttock   Neurological:      General: No focal deficit present.      Mental Status: He is alert. Mental status is at baseline. He is confused.      Comments: Expressive aphasia         Results Review:  I reviewed the patient's new clinical results.  I reviewed the patient's new imaging results and agree with the interpretation.  I reviewed the patient's other test results and agree with the interpretation  I personally viewed and interpreted the patient's EKG/Telemetry data  Discussed with ED provider.    Lab Results (last 24 hours)     Procedure Component Value Units Date/Time    CBC & Differential [[id number]]  (Abnormal) Collected: 11/15/21 0051    Specimen: Blood Updated: 11/15/21 0107    Narrative:      The following orders were created for panel order CBC & Differential.  Procedure                               Abnormality         Status                     ---------                               -----------         ------                     CBC Auto Differential[[id number]]        Abnormal            Final result                 Please view results for these tests on the individual orders.    Basic Metabolic Panel [[id number]]  (Abnormal) Collected: 11/15/21 0051    Specimen: Blood Updated: 11/15/21 0122     Glucose 119 mg/dL      BUN 19 mg/dL      Creatinine 2.04 mg/dL      Sodium 141 mmol/L      Potassium 3.9 mmol/L      Chloride 104 mmol/L      CO2 25.4 mmol/L      Calcium 8.7 mg/dL      eGFR Non African Amer 31 mL/min/1.73      BUN/Creatinine Ratio 9.3     Anion Gap 11.6 mmol/L     Narrative:      GFR Normal >60  Chronic Kidney Disease <60  Kidney Failure <15      Protime-INR [[id number]]  (Abnormal) Collected: 11/15/21 0051    Specimen: Blood Updated: 11/15/21 0142     Protime 20.2 Seconds      INR 1.75    aPTT [[id number]]  (Abnormal) Collected: 11/15/21 0051    Specimen: Blood Updated: 11/15/21 0142     PTT 37.2 seconds     CBC Auto Differential [[id number]]  (Abnormal) Collected: 11/15/21  0051    Specimen: Blood Updated: 11/15/21 0107     WBC 8.95 10*3/mm3      RBC 3.25 10*6/mm3      Hemoglobin 10.7 g/dL      Hematocrit 31.2 %      MCV 96.0 fL      MCH 32.9 pg      MCHC 34.3 g/dL      RDW 12.5 %      RDW-SD 44.1 fl      MPV 9.7 fL      Platelets 183 10*3/mm3      Neutrophil % 59.1 %      Lymphocyte % 29.3 %      Monocyte % 7.8 %      Eosinophil % 3.2 %      Basophil % 0.4 %      Immature Grans % 0.2 %      Neutrophils, Absolute 5.28 10*3/mm3      Lymphocytes, Absolute 2.62 10*3/mm3      Monocytes, Absolute 0.70 10*3/mm3      Eosinophils, Absolute 0.29 10*3/mm3      Basophils, Absolute 0.04 10*3/mm3      Immature Grans, Absolute 0.02 10*3/mm3      nRBC 0.0 /100 WBC     COVID PRE-OP / PRE-PROCEDURE SCREENING ORDER (NO ISOLATION) - Swab, Nasopharynx [[id number]]  (Normal) Collected: 11/15/21 0324    Specimen: Swab from Nasopharynx Updated: 11/15/21 0559    Narrative:      The following orders were created for panel order COVID PRE-OP / PRE-PROCEDURE SCREENING ORDER (NO ISOLATION) - Swab, Nasopharynx.  Procedure                               Abnormality         Status                     ---------                               -----------         ------                     COVID-19,[facility] IN-HOUSE...[[id number]]  Normal              Final result                 Please view results for these tests on the individual orders.    COVID-19,[facility] IN-HOUSE CEPHEID/STELLA NP SWAB IN TRANSPORT MEDIA 8-12 HR TAT - Swab, Nasopharynx [[id number]]  (Normal) Collected: 11/15/21 0324    Specimen: Swab from Nasopharynx Updated: 11/15/21 0559     COVID19 Not Detected    Narrative:      Fact sheet for providers: https://www.fda.gov/media/316331/download    Fact sheet for patients: https://www.fda.gov/media/389249/download    Test performed by PCR.          Imaging Results (Last 24 Hours)     Procedure Component Value Units Date/Time    CT Pelvis Without Contrast [[id number]] Collected: 11/15/21 0208     Updated: 11/15/21 0226     Narrative:      CT OF THE BONY PELVIS     HISTORY: Right hip pain after a fall     COMPARISON: None available.     TECHNIQUE: Axial CT imaging was obtained through the pelvis. Coronal and  sagittal reformatted images were obtained.     FINDINGS:  There is a nondisplaced fracture of the right sacral ala. There is a  comminuted right inferior pubic ramus fracture. There is a fracture of  the anterior wall of the right acetabulum, with extension into the right  superior pubic ramus. No extension into the iliac wing is seen. Soft  tissue windows demonstrate some hemorrhage along the right pelvic  sidewall. Large amount of stool is seen within the rectum, which may  reflect impaction. Some additional hemorrhage is noted overlying the  right lateral thigh, and within the musculature adjacent to the right  inferior pubic radius. No fractures are identified on the left. There  are changes of prior inguinal hernia repair with mesh.       Impression:      There is a fracture of the right acetabulum, with extension into the  right superior pubic ramus. Patient is also noted to have a comminuted  fracture of the right inferior pubic ramus, as well as a fracture of the  right sacral ala.     Radiation dose reduction techniques were utilized, including automated  exposure control and exposure modulation based on body size.     This report was finalized on 11/15/2021 2:23 AM by Dr. Gaviota Bowen M.D.                 No orders to display        Assessment/Plan     Active Hospital Problems    Diagnosis  POA   • Fall at nursing home [W19.XXXA, Y92.129]  Yes   • Multiple pelvic fractures (HCC) [S32.82XA]  Unknown   • Atrial fibrillation (HCC) [I48.91]  Unknown   • History of stroke [Z86.73]  Not Applicable   • HLD (hyperlipidemia) [E78.5]  Unknown   • Stage 3b chronic kidney disease (HCC) [N18.32]  Unknown   • GERD (gastroesophageal reflux disease) [K21.9]  Unknown       Multiple Pelvic Fractures  -Admit to a med/surg unit for  monitoring  -Neurovascular checks  -Orthopedic surgery consult  -PRN analgesia  -PT consult    Atrial Fibrillation  -Rate controlled. Continue Metoprolol for rate control  -Hold Eliquis secondary to right hip hematoma    History of Stroke/Hyperlipidemia  -With residual expressive aphasia  -Hold Eliquis and Aspirin, continue statin    Chronic Kidney Disease Stage 3b  -Creat up a bit from baseline  -IVF  -Avoid nephrotoxins  -Repeat lab work in AM    GERD  -Continue home PPI    -Will address and continue any further appropriate home medications once med rec is completed    -I discussed the patients findings and my recommendations with patient.    VTE Prophylaxis - SCDs.  Code Status - Full code.       ORQUIDEA Ferguson  [facility]  11/15/21  06:30 EST

## 2021-11-16 LAB
DEPRECATED RDW RBC AUTO: 42.4 FL (ref 37–54)
ERYTHROCYTE [DISTWIDTH] IN BLOOD BY AUTOMATED COUNT: 12.5 % (ref 12.3–15.4)
HCT VFR BLD AUTO: 29.2 % (ref 37.5–51)
HGB BLD-MCNC: 10.2 G/DL (ref 13–17.7)
MCH RBC QN AUTO: 32.7 PG (ref 26.6–33)
MCHC RBC AUTO-ENTMCNC: 34.9 G/DL (ref 31.5–35.7)
MCV RBC AUTO: 93.6 FL (ref 79–97)
PLATELET # BLD AUTO: 153 10*3/MM3 (ref 140–450)
PMV BLD AUTO: 10.1 FL (ref 6–12)
RBC # BLD AUTO: 3.12 10*6/MM3 (ref 4.14–5.8)
WBC # BLD AUTO: 8.64 10*3/MM3 (ref 3.4–10.8)

## 2021-11-16 PROCEDURE — 85027 COMPLETE CBC AUTOMATED: CPT | Performed by: HOSPITALIST

## 2021-11-16 PROCEDURE — 97530 THERAPEUTIC ACTIVITIES: CPT

## 2021-11-16 PROCEDURE — 97162 PT EVAL MOD COMPLEX 30 MIN: CPT

## 2021-11-16 RX ADMIN — ATORVASTATIN CALCIUM 40 MG: 20 TABLET, FILM COATED ORAL at 21:15

## 2021-11-16 RX ADMIN — DOCUSATE SODIUM 50MG AND SENNOSIDES 8.6MG 2 TABLET: 8.6; 5 TABLET, FILM COATED ORAL at 21:16

## 2021-11-16 RX ADMIN — HYDROCODONE BITARTRATE AND ACETAMINOPHEN 1 TABLET: 5; 325 TABLET ORAL at 09:25

## 2021-11-16 RX ADMIN — METOPROLOL SUCCINATE 50 MG: 50 TABLET, EXTENDED RELEASE ORAL at 09:25

## 2021-11-16 RX ADMIN — Medication 500 MCG: at 09:25

## 2021-11-16 RX ADMIN — POLYETHYLENE GLYCOL 3350 17 G: 17 POWDER, FOR SOLUTION ORAL at 16:26

## 2021-11-16 RX ADMIN — HYDROCODONE BITARTRATE AND ACETAMINOPHEN 1 TABLET: 5; 325 TABLET ORAL at 16:26

## 2021-11-16 RX ADMIN — PANTOPRAZOLE SODIUM 40 MG: 40 TABLET, DELAYED RELEASE ORAL at 06:43

## 2021-11-16 RX ADMIN — METOPROLOL SUCCINATE 50 MG: 50 TABLET, EXTENDED RELEASE ORAL at 21:16

## 2021-11-16 NOTE — PLAN OF CARE
"Goal Outcome Evaluation:           Progress: no change  Outcome Summary: Pt evaluated by PT today and is NWB, turns q 2, bed alarm on.  Given Norco for pain because he grunts and moans when turned.  Pt is wearing SCD's and takes Eliquis for a fib.  Pt is using urinal and wearing a clean brief, he has had several loose BM's today.  Pt does not say more than \"yes\" or 'no\" but follows directions well.  VSS, will continue to monitor.  "

## 2021-11-16 NOTE — PLAN OF CARE
Goal Outcome Evaluation:              Outcome Summary: Pt disoriented x 4.  Pt is wearing SCD's and positioned with pillows with a clean brief.  Has had several loose stools today.  Pt on bedrest, wearing brief and using urinal with assistance. VSS, will continue to monitor.

## 2021-11-16 NOTE — THERAPY EVALUATION
Patient Name: Suhail Springer  : 1935    MRN: [id number]                              Today's Date: 2021       Admit Date: 11/15/2021    Visit Dx:     ICD-10-CM ICD-9-CM   1. Fall at nursing home, initial encounter  W19.XXXA E888.9    Y92.129 E849.7   2. Contusion of right hip, initial encounter  S70.01XA 924.01   3. Skin tear of right forearm without complication, initial encounter  S51.811A 881.00   4. Atrial fibrillation, unspecified type (HCC)  I48.91 427.31   5. Primary hypertension  I10 401.9   6. Hyperlipidemia, unspecified hyperlipidemia type  E78.5 272.4   7. History of CVA (cerebrovascular accident)  Z86.73 V12.54   8. Anticoagulated  Z79.01 V58.61   9. Chronic kidney disease, unspecified CKD stage  N18.9 585.9   10. Closed nondisplaced fracture of right acetabulum, unspecified portion of acetabulum, initial encounter ([facility])  S32.401A 808.0   11. Closed fracture of right inferior pubic ramus, initial encounter ([facility])  S32.591A 808.2   12. Anemia, unspecified type  D64.9 285.9     Patient Active Problem List   Diagnosis   • Cerebrovascular accident (CVA) due to stenosis of precerebral artery (HCC)   • Stenosis of left carotid artery   • Fall at nursing home   • Multiple pelvic fractures (HCC)   • Atrial fibrillation (HCC)   • History of stroke   • HLD (hyperlipidemia)   • Stage 3b chronic kidney disease (HCC)   • GERD (gastroesophageal reflux disease)     Past Medical History:   Diagnosis Date   • A-fib (HCC)     ASYMPTOMATIC- SEE'S DR HASSAN- DENIED CP/SOB   • Arthritis    • Cancer (HCC)     SKIN CANCER LESION    • Carotid stenosis     STENOSIS OF LEFT CAROTID    • CKD (chronic kidney disease)     MONITORED BY PCP TWICE A YEAR   • CVA, old, aphasia 10/07/2021   • Hyperlipidemia    • Hypertension    • Pacemaker    • Sleep apnea     NO CPAP   • Stroke (HCC)     2021- WENT TO REHAB- DISCHARGED 21- GENERALIZED WEAKNESS      Past Surgical History:   Procedure Laterality Date   •  CAROTID ENDARTERECTOMY Left 11/3/2021    Procedure: LEFT CAROTID ENDARTERECTOMY WITH SHUNT, BOVINE PATCH;  Surgeon: Olegario Hall MD;  Location: [facility];  Service: Vascular;  Laterality: Left;   • EXCISION LESION      PRE CANCERIOUS LESION REMOVED      General Information     Row Name 11/16/21 1250          Physical Therapy Time and Intention    Document Type evaluation (P)   -CS     Mode of Treatment individual therapy; physical therapy (P)   -CS     Row Name 11/16/21 1250          General Information    Patient Profile Reviewed yes (P)   -CS     Prior Level of Function -- (P)   unable to obtain PLOF secondary to pt's cognitive status  -CS     Existing Precautions/Restrictions fall; right; non-weight bearing (P)   R LE TTWB but per MD, NWB secondary to pt's cognitive status  -CS     Barriers to Rehab cognitive status (P)   -CS     Row Name 11/16/21 1250          Living Environment    Lives With facility resident (P)   -CS     Row Name 11/16/21 1250          Cognition    Orientation Status (Cognition) unable/difficult to assess (P)   -CS     Row Name 11/16/21 1250          Safety Issues, Functional Mobility    Impairments Affecting Function (Mobility) balance; coordination; endurance/activity tolerance; pain; strength (P)   -CS           User Key  (r) = Recorded By, (t) = Taken By, (c) = Cosigned By    Initials Name Provider Type    CS Toña Mena, PT Student PT Student               Mobility     Row Name 11/16/21 1253          Bed Mobility    Bed Mobility supine-sit; sit-supine (P)   -CS     Supine-Sit Dane (Bed Mobility) moderate assist (50% patient effort); 2 person assist (P)   -CS     Sit-Supine Dane (Bed Mobility) moderate assist (50% patient effort) (P)   -CS     Assistive Device (Bed Mobility) bed rails; head of bed elevated (P)   -CS     Comment (Bed Mobility) pt required assist for LE management when returning to supine (P)   -CS     Row Name 11/16/21 1252          Transfers     Comment (Transfers) pt unable to maintain WB'ing restrictions so pt assisted back to sitting EOB (P)   -CS     Row Name 11/16/21 1252          Sit-Stand Transfer    Sit-Stand [place] (Transfers) maximum assist (25% patient effort); 2 person assist; verbal cues (P)   -CS     Assistive Device (Sit-Stand Transfers) walker, front-wheeled (P)   -CS           User Key  (r) = Recorded By, (t) = Taken By, (c) = Cosigned By    Initials Name Provider Type    Toña Gustafson, PT Student PT Student               Obj/Interventions     Row Name 11/16/21 1253          Range of Motion Comprehensive    General Range of Motion bilateral lower extremity ROM WFL (P)   -     Row Name 11/16/21 1253          Strength Comprehensive (MMT)    Comment, General Manual Muscle Testing (MMT) Assessment B LE >/= 3/5 grossly (P)   -     Row Name 11/16/21 1253          Balance    Balance Assessment sitting static balance; standing static balance; sitting dynamic balance (P)   -CS     Static Sitting Balance WFL; sitting, edge of bed (P)   -CS     Dynamic Sitting Balance mild impairment; sitting, edge of bed (P)   -CS     Static Standing Balance moderate impairment; severe impairment; supported; standing (P)   -CS           User Key  (r) = Recorded By, (t) = Taken By, (c) = Cosigned By    Initials Name Provider Type    Toña Gustafson, PT Student PT Student               Goals/Plan     Row Name 11/16/21 1300          Bed Mobility Goal 1 (PT)    Activity/Assistive Device (Bed Mobility Goal 1, PT) sit to supine; supine to sit (P)   -CS     [place] Level/Cues Needed (Bed Mobility Goal 1, PT) minimum assist (75% or more patient effort) (P)   -CS     Time Frame (Bed Mobility Goal 1, PT) 1 week (P)   -     Row Name 11/16/21 1300          Transfer Goal 1 (PT)    Activity/Assistive Device (Transfer Goal 1, PT) sit-to-stand/stand-to-sit; walker, rolling  (P)   maintaining WB'ing precautions  -CS     [place] Level/Cues Needed  (Transfer Goal 1, PT) minimum assist (75% or more patient effort); 2 person assist (P)   -CS     Time Frame (Transfer Goal 1, PT) 1 week (P)   -CS           User Key  (r) = Recorded By, (t) = Taken By, (c) = Cosigned By    Initials Name Provider Type    CS Toña Mena, PT Student PT Student               Clinical Impression     Row Name 11/16/21 1043          Pain    Additional Documentation Pain Scale: FACES Pre/Post-Treatment (Group) (P)   -CS     Row Name 11/16/21 6781          Pain Scale: FACES Pre/Post-Treatment    Pain: FACES Scale, Pretreatment 2-->hurts little bit (P)   -CS     Posttreatment Pain Rating 2-->hurts little bit (P)   -CS     Pain Location - Side Right (P)   -CS     Pain Location - Orientation lower (P)   -CS     Pain Location hip (P)   -CS     Row Name 11/16/21 6874          Plan of Care Review    Plan of Care Reviewed With patient (P)   -CS     Outcome Summary Pt is a 86 yo M s/p fall resulting in a fracture of the right acetabulum with extension into the right superior pubic ramus as well as a comminuted fracture of the right inferior pubic ramus and right sacral ala. Per MD Caballero, pt should be NWB for the next 6 weeks. Pt has a hx of dementia so PLOF history was not obtained. Pt currently resides at a senior living facility. Pt presents to therapy with generalized weakness, impaired balance, decreased endurance to activity, and functional mobility below baseline. Pt attempted to stand today requiring max A x 2 c RW but pt unable to maintain WB'ing restrictions and was assisted back to sitting EOB. Pt fatigued quickly and requested to return to bed. PT is recommending SNF at [place] with PT. PT will continue to follow to address strength, mobility, and gait. (P)   -CS     Row Name 11/16/21 2653          Therapy Assessment/Plan (PT)    Rehab Potential (PT) fair, will monitor progress closely (P)   -CS     Criteria for Skilled Interventions Met (PT) yes; meets criteria (P)   -CS     Row Name  11/16/21 1254          Positioning and Restraints    Pre-Treatment Position in bed (P)   -CS     Post Treatment Position bed (P)   -CS     In Bed supine; call light within reach; encouraged to call for assist; exit alarm on (P)   -CS           User Key  (r) = Recorded By, (t) = Taken By, (c) = Cosigned By    Initials Name Provider Type    Toña Gustafson, PT Student PT Student               Outcome Measures     Row Name 11/16/21 1301          How much help from another person do you currently need...    Turning from your back to your side while in flat bed without using bedrails? 3 (P)   -CS     Moving from lying on back to sitting on the side of a flat bed without bedrails? 2 (P)   -CS     Moving to and from a bed to a chair (including a wheelchair)? 1 (P)   -CS     Standing up from a chair using your arms (e.g., wheelchair, bedside chair)? 2 (P)   -CS     Climbing 3-5 steps with a railing? 1 (P)   -CS     To walk in hospital room? 1 (P)   -CS     AM-PAC 6 Clicks Score (PT) 10 (P)   -CS     Row Name 11/16/21 1301          Functional Assessment    Outcome Measure Options AM-PAC 6 Clicks Basic Mobility (PT) (P)   -CS           User Key  (r) = Recorded By, (t) = Taken By, (c) = Cosigned By    Initials Name Provider Type    Toña Gustafson, PT Student PT Student                             Physical Therapy Education                 Title: PT OT SLP Therapies (In Progress)     Topic: Physical Therapy (In Progress)     Point: Mobility training (In Progress)     Learning Progress Summary           Patient Acceptance, E,TB, NR by CS at 11/16/2021 1302                   Point: Home exercise program (In Progress)     Learning Progress Summary           Patient Acceptance, E,TB, NR by CS at 11/16/2021 1302                   Point: Body mechanics (In Progress)     Learning Progress Summary           Patient Acceptance, E,TB, NR by CS at 11/16/2021 1302                   Point: Precautions (In Progress)     Learning  Progress Summary           Patient Acceptance, E,TB, NR by  at 11/16/2021 1302                               User Key     Initials Effective Dates Name Provider Type Discipline     08/30/21 -  Toña Mena, PT Student PT Student PT              PT Recommendation and Plan     Plan of Care Reviewed With: (P) patient  Outcome Summary: (P) Pt is a 86 yo M s/p fall resulting in a fracture of the right acetabulum with extension into the right superior pubic ramus as well as a comminuted fracture of the right inferior pubic ramus and right sacral ala. Per MD Caballero, pt should be NWB for the next 6 weeks. Pt has a hx of dementia so PLOF history was not obtained. Pt currently resides at a senior living facility. Pt presents to therapy with generalized weakness, impaired balance, decreased endurance to activity, and functional mobility below baseline. Pt attempted to stand today requiring max A x 2 c RW but pt unable to maintain WB'ing restrictions and was assisted back to sitting EOB. Pt fatigued quickly and requested to return to bed. PT is recommending SNF at [place] with PT. PT will continue to follow to address strength, mobility, and gait.     Time Calculation:    PT Charges     Row Name 11/16/21 1302             Time Calculation    Start Time 1132 (P)   -CS      Stop Time 1143 (P)   -      Time Calculation (min) 11 min (P)   -      PT Received On 11/16/21 (P)   -      PT - Next Appointment 11/17/21 (P)   -      PT Goal Re-Cert Due Date 11/23/21 (P)   -              Time Calculation- PT    Total Timed Code Minutes- PT 9 minute(s) (P)   -              Timed Charges    32288 - PT Therapeutic Activity Minutes 9 (P)   -CS              Total Minutes    Timed Charges Total Minutes 9 (P)   -CS       Total Minutes 9 (P)   -CS            User Key  (r) = Recorded By, (t) = Taken By, (c) = Cosigned By    Initials Name Provider Type    CS Toña Mena, PT Student PT Student              Therapy Charges for Today      Code Description Service Date Service Provider Modifiers Qty    41102951807  PT THERAPEUTIC ACT EA 15 MIN 11/16/2021 Toña Mena, PT Student GP 1    [id number] HC PT EVAL MOD COMPLEXITY 2 11/16/2021 Toña Mena, PT Student GP 1    [id number]  PT THER SUPP EA 15 MIN 11/16/2021 Toña Mena, PT Student GP 1          PT G-Codes  Outcome Measure Options: (P) AM-PAC 6 Clicks Basic Mobility (PT)  AM-PAC 6 Clicks Score (PT): (P) 10    Toña Mena PT Student  11/16/2021

## 2021-11-16 NOTE — CONSULTS
Orthopaedic Surgery  Consult Note  Dr. MANUELITO La Ada II  [phone]    HPI:  Patient is a 85 y.o. Not  or  male who presents with right hip pain after a fall.  This patient has a history of fairly advanced dementia and multiple medical comorbidities.  He had a fall at the nursing home and was complaining of right hip pain.  A CT scan performed in the emergency room demonstrated a right sacral, pubic ramus, and acetabular fracture.  The patient complains of pain with any motion or attempted weightbearing.  Patient is unable to qualify the nature of the pain in terms of being sharp or dull only that it hurts.  He is minimally ambulatory at baseline.    MEDICAL HISTORY  Past Medical History:   Diagnosis Date   • A-fib (HCC)     ASYMPTOMATIC- SEE'S DR HASSAN- DENIED CP/SOB   • Arthritis    • Cancer (HCC)     SKIN CANCER LESION    • Carotid stenosis     STENOSIS OF LEFT CAROTID    • CKD (chronic kidney disease)     MONITORED BY PCP TWICE A YEAR   • CVA, old, aphasia 10/07/2021   • Hyperlipidemia    • Hypertension    • Pacemaker    • Sleep apnea     NO CPAP   • Stroke (HCC)     OCTOBER 7, 2021- WENT TO REHAB- DISCHARGED 11-1-21- GENERALIZED WEAKNESS    ·   Past Surgical History:   Procedure Laterality Date   • CAROTID ENDARTERECTOMY Left 11/3/2021    Procedure: LEFT CAROTID ENDARTERECTOMY WITH SHUNT, BOVINE PATCH;  Surgeon: Olegario Hall MD;  Location: [facility];  Service: Vascular;  Laterality: Left;   • EXCISION LESION      PRE CANCERIOUS LESION REMOVED   ·   Prior to Admission medications    Medication Sig Start Date End Date Taking? Authorizing Provider   apixaban (ELIQUIS) 5 MG tablet tablet Take 5 mg by mouth 2 (Two) Times a Day. PER DR HALL TO STOP 3 DAYS PRIOR TO SURGERY, PT REPORTED LAST DOSE 10-30-21    Provider, MD Kinza   aspirin 81 MG EC tablet Take 1 tablet by mouth Daily. 11/5/21 7/22/24  Olegario Hall MD   atorvastatin (LIPITOR) 40 MG tablet Take 40 mg by  "mouth Every Night.    Kinza El MD   ferrous sulfate 325 (65 FE) MG tablet Take 325 mg by mouth Daily With Breakfast.    Kinza El MD   fluticasone (FLONASE) 50 MCG/ACT nasal spray 1 spray into the nostril(s) as directed by provider Daily As Needed.    Kinza El MD   metoprolol succinate XL (TOPROL-XL) 25 MG 24 hr tablet Take 50 mg by mouth 2 (Two) Times a Day.    Kinza El MD   omeprazole (priLOSEC) 40 MG capsule Take 40 mg by mouth Daily.    Kinza El MD   vitamin B-12 (CYANOCOBALAMIN) 500 MCG tablet Take 500 mcg by mouth Daily.    Kinza El MD   ·   Allergies   Allergen Reactions   • Pollen Extract Shortness Of Breath   ·   ·   · There is no immunization history on file for this patient.  Social History     Tobacco Use   • Smoking status: Former Smoker     Types: Cigarettes   • Smokeless tobacco: Never Used   • Tobacco comment: QUIT 40 YEARS AGO   Substance Use Topics   • Alcohol use: Never   ·    Social History     Substance and Sexual Activity   Drug Use Never   ·     VITALS: /71 (BP Location: Left arm, Patient Position: Lying)   Pulse 82   Temp 98.3 °F (36.8 °C) (Oral)   Resp 18   Ht 170.2 cm (67\")   Wt 73 kg (161 lb)   SpO2 93%   BMI 25.22 kg/m²  Body mass index is 25.22 kg/m².    PHYSICAL EXAM:   · CONSTITUTIONAL: No acute distress  · LUNGS: Equal chest rise, no shortness of air  · CARDIOVASCULAR: palpable peripheral pulses  · SKIN: no skin lesions in the area examined  · LYMPH: no lymphadenopathy in the area examined  · EXTREMITY: Right Lower Extremity  · Tenderness to Palpation: No tenderness to palpation  · Gross Deformity: No Gross Deformity  · Pulses:  Brisk Capillary Refill  · Sensation: Unable to assess  · Motor: Patient is freely moving the leg including his ankles and toes   · Range of motion: Minimal discomfort with logroll or passive range of motion of the hip    RADIOLOGY REVIEW:   CT Pelvis Without " "Contrast    Result Date: 11/15/2021  There is a fracture of the right acetabulum, with extension into the right superior pubic ramus. Patient is also noted to have a comminuted fracture of the right inferior pubic ramus, as well as a fracture of the right sacral ala.  Radiation dose reduction techniques were utilized, including automated exposure control and exposure modulation based on body size.  This report was finalized on 11/15/2021 2:23 AM by Dr. Gaviota Bowen M.D.        LABS:   Results for the past 24 hours:   Recent Results (from the past 24 hour(s))   CBC (No Diff)    Collection Time: 11/16/21  5:58 AM    Specimen: Blood   Result Value Ref Range    WBC 8.64 3.40 - 10.80 10*3/mm3    RBC 3.12 (L) 4.14 - 5.80 10*6/mm3    Hemoglobin 10.2 (L) 13.0 - 17.7 g/dL    Hematocrit 29.2 (L) 37.5 - 51.0 %    MCV 93.6 79.0 - 97.0 fL    MCH 32.7 26.6 - 33.0 pg    MCHC 34.9 31.5 - 35.7 g/dL    RDW 12.5 12.3 - 15.4 %    RDW-SD 42.4 37.0 - 54.0 fl    MPV 10.1 6.0 - 12.0 fL    Platelets 153 140 - 450 10*3/mm3       IMPRESSION:  Patient is a 85 y.o. Not  or  male with right acetabular, sacral, and pubic ramus fractures    PLAN:   · Admited to: Cesar Root MD  · Disposition: I recommend toe-touch weightbearing for 6 weeks for this patient.  He will unlikely be able to adhere to these restrictions and therefore he will likely be nonweightbearing with bed rest due to his dementia.  He can follow-up with me in about 6 weeks for repeat x-ray and advancement of weightbearing.  Fortunately there is no need for surgery or acute orthopedic intervention.    R \"Lisandro\" Ada NICHOLAS MD  Orthopaedic Surgery  [facility]  [phone] - [place] [facility]  [phone] - [place] Office            "

## 2021-11-16 NOTE — PLAN OF CARE
Goal Outcome Evaluation:  Plan of Care Reviewed With: patient           Outcome Summary: Disoriented x4. Can answer simple Yes or No questions. Aphasic but follows commands well. Still having loose BMs. Orthopedic to see. Bed alarm. Q2 turn. Bed rest. VSS. Will continue to monitor.

## 2021-11-16 NOTE — PLAN OF CARE
Goal Outcome Evaluation:  Plan of Care Reviewed With: (P) patient           Outcome Summary: (P) Pt is a 84 yo M s/p fall resulting in a fracture of the right acetabulum with extension into the right superior pubic ramus as well as a comminuted fracture of the right inferior pubic ramus and right sacral ala. Per MD Caballero, pt should be NWB for the next 6 weeks. Pt has a hx of dementia so PLOF history was not obtained. Pt currently resides at a senior living facility. Pt presents to therapy with generalized weakness, impaired balance, decreased endurance to activity, and functional mobility below baseline. Pt attempted to stand today requiring max A x 2 c RW but pt unable to maintain WB'ing restrictions and was assisted back to sitting EOB. Pt fatigued quickly and requested to return to bed. PT is recommending SNF at [place] with PT. PT will continue to follow to address strength, mobility, and gait.

## 2021-11-16 NOTE — PROGRESS NOTES
Dedicated to [facility]    [phone]   LOS: 1 day     Name: Suhail Springer  Age/Sex: 85 y.o. male  :  1935        PCP: Basilio Diaz MD  Chief Complaint   Patient presents with   • Hip Injury      Subjective   He feels okay today he is just pretty sore.  He denies new issues or complaints.  No chest pain patient shortness of breath fevers chills nausea vomiting  General: No Fever or Chills, Cardiac: No Chest Pain or Palpitations, Resp: No Cough or SOA, GI: No Nausea, Vomiting, or Diarrhea and Other: No bleeding    atorvastatin, 40 mg, Oral, Nightly  bisacodyl, 10 mg, Rectal, Daily  metoprolol succinate XL, 50 mg, Oral, BID  pantoprazole, 40 mg, Oral, QAM  polyethylene glycol, 17 g, Oral, TID  senna-docusate sodium, 2 tablet, Oral, BID  sodium chloride, 10 mL, Intravenous, Q12H  vitamin B-12, 500 mcg, Oral, Daily           Objective   Vital Signs  Temp:  [97.4 °F (36.3 °C)-99 °F (37.2 °C)] 97.9 °F (36.6 °C)  Heart Rate:  [73-85] 73  Resp:  [16-18] 16  BP: (122-149)/(71-75) 123/73  Body mass index is 25.22 kg/m².    Intake/Output Summary (Last 24 hours) at 2021 1323  Last data filed at 2021 0725  Gross per 24 hour   Intake 150 ml   Output 475 ml   Net -325 ml       Physical Exam  Vitals and nursing note reviewed.   Constitutional:       General: He is not in acute distress.     Appearance: Normal appearance.   Cardiovascular:      Rate and Rhythm: Normal rate and regular rhythm.   Pulmonary:      Effort: No respiratory distress.      Breath sounds: Normal breath sounds.   Abdominal:      General: Bowel sounds are normal. There is no distension.      Palpations: Abdomen is soft.   Neurological:      General: No focal deficit present.      Mental Status: He is alert and oriented to person, place, and time.           Results Review:       I reviewed the patient's new clinical results.  Results from last 7 days   Lab Units 21  0558 11/15/21  0739 11/15/21  0051   WBC 10*3/mm3 8.64  7.76 8.95   HEMOGLOBIN g/dL 10.2* 10.3*  10.3* 10.7*   PLATELETS 10*3/mm3 153 160 183     Results from last 7 days   Lab Units 11/15/21  0739 11/15/21  0051   SODIUM mmol/L 139 141   POTASSIUM mmol/L 3.8 3.9   CHLORIDE mmol/L 103 104   CO2 mmol/L 23.7 25.4   BUN mg/dL 19 19   CREATININE mg/dL 1.92* 2.04*   CALCIUM mg/dL 8.8 8.7   Estimated Creatinine Clearance: 30.1 mL/min (A) (by C-G formula based on SCr of 1.92 mg/dL (H)).      Assessment/Plan   Active Hospital Problems    Diagnosis  POA   • Fall at nursing home [W19.XXXA, Y92.129]  Yes   • Multiple pelvic fractures (HCC) [S32.82XA]  Unknown   • Atrial fibrillation (HCC) [I48.91]  Unknown   • History of stroke [Z86.73]  Not Applicable   • HLD (hyperlipidemia) [E78.5]  Unknown   • Stage 3b chronic kidney disease (HCC) [N18.32]  Unknown   • GERD (gastroesophageal reflux disease) [K21.9]  Unknown      Resolved Hospital Problems   No resolved problems to display.       PLAN  This is an 85-year-old gentleman with a history of chronic kidney disease A. fib gastroesophageal reflux disease and hyperlipidemia that presents after a fall and is found to have fractures of his right acetabulum, pubic rami and sacrum  -Plan for nonoperative management.  He will need to be nonweightbearing on the right lower extremity.  -Likely to need physical therapy and Occupational Therapy in the outpatient setting  -We will see how the patient does with physical therapy today  -Probable plan to discharge back to his skilled nursing facility tomorrow  -I reviewed the imaging with the patient's son at the bedside as well as the patient  -All questions addressed and answered      Disposition  Plan discharge tomorrow      Deven Gardner MD  [facility]  11/16/21  13:23 EST

## 2021-11-17 RX ORDER — HYDROCODONE BITARTRATE AND ACETAMINOPHEN 5; 325 MG/1; MG/1
1 TABLET ORAL EVERY 6 HOURS PRN
Qty: 20 TABLET | Refills: 0 | Status: SHIPPED | OUTPATIENT
Start: 2021-11-17 | End: 2021-11-22

## 2021-11-17 RX ADMIN — ACETAMINOPHEN 650 MG: 325 TABLET, FILM COATED ORAL at 05:28

## 2021-11-17 RX ADMIN — DOCUSATE SODIUM 50MG AND SENNOSIDES 8.6MG 2 TABLET: 8.6; 5 TABLET, FILM COATED ORAL at 20:33

## 2021-11-17 RX ADMIN — POLYETHYLENE GLYCOL 3350 17 G: 17 POWDER, FOR SOLUTION ORAL at 17:33

## 2021-11-17 RX ADMIN — METOPROLOL SUCCINATE 50 MG: 50 TABLET, EXTENDED RELEASE ORAL at 20:33

## 2021-11-17 RX ADMIN — Medication 500 MCG: at 17:33

## 2021-11-17 RX ADMIN — DOCUSATE SODIUM 50MG AND SENNOSIDES 8.6MG 2 TABLET: 8.6; 5 TABLET, FILM COATED ORAL at 08:42

## 2021-11-17 RX ADMIN — POLYETHYLENE GLYCOL 3350 17 G: 17 POWDER, FOR SOLUTION ORAL at 20:33

## 2021-11-17 RX ADMIN — ATORVASTATIN CALCIUM 40 MG: 20 TABLET, FILM COATED ORAL at 20:33

## 2021-11-17 RX ADMIN — POLYETHYLENE GLYCOL 3350 17 G: 17 POWDER, FOR SOLUTION ORAL at 08:42

## 2021-11-17 RX ADMIN — PANTOPRAZOLE SODIUM 40 MG: 40 TABLET, DELAYED RELEASE ORAL at 05:28

## 2021-11-17 NOTE — DISCHARGE SUMMARY
Patient Name: Suhail Springer  : 1935  MRN: [id number]    Date of Admission: 11/15/2021  Date of Discharge:  2021  Primary Care Physician: Basilio Diaz MD      Chief Complaint:   Hip Injury      Discharge Diagnoses     Active Hospital Problems    Diagnosis  POA   • Fall at nursing home [W19.XXXA, Y92.129]  Yes   • Multiple pelvic fractures (HCC) [S32.82XA]  Unknown   • Atrial fibrillation (HCC) [I48.91]  Unknown   • History of stroke [Z86.73]  Not Applicable   • HLD (hyperlipidemia) [E78.5]  Unknown   • Stage 3b chronic kidney disease (HCC) [N18.32]  Unknown   • GERD (gastroesophageal reflux disease) [K21.9]  Unknown      Resolved Hospital Problems   No resolved problems to display.        Hospital Course     Mr. Springer is a 85 y.o. male with a history of dementia, chronic kidney disease, A. fib and history of stroke who presented to [facility] initially complaining of multiple falls and hip pain.  Please see the admitting history and physical for further details.  He was found to have hip pain and was admitted to the hospital for further evaluation and treatment.  He was admitted to the hospital and was sent for a CT pelvis to evaluate for occult fractures.  He was found to have an acetabular fracture as well as pubic rami fractures and sacral fractures.  He was seen and evaluated orthopedic surgery and unfortunately management for these is nonoperative.  This will require nonweightbearing as he would be unable to likely follow adequate restrictions for healing.  This will not do well with his underlying dementia and him being pretty much bed and wheelchair-bound.  Plan is to discharge back to the nursing home where he can continue physical and occupational therapy.  He can follow-up with orthopedic surgery in the outpatient setting in a week or 2 for repeat imaging to follow healing.  The plans been discussed with the discharge planner and I discussed with the son yesterday.   Unfortunately transportation can be arranged until tomorrow.        Day of Discharge     Subjective:  Denies pain or discomfort today he is resting comfortably in the bed no distress    Physical Exam:  Temp:  [97.4 °F (36.3 °C)-98.4 °F (36.9 °C)] 97.8 °F (36.6 °C)  Heart Rate:  [70-78] 70  Resp:  [16-18] 16  BP: (103-128)/(57-77) 105/60  Body mass index is 25.22 kg/m².  Physical Exam  Vitals and nursing note reviewed.   Constitutional:       General: He is not in acute distress.     Appearance: Normal appearance. He is not ill-appearing.   Pulmonary:      Effort: Pulmonary effort is normal. No respiratory distress.   Neurological:      General: No focal deficit present.      Mental Status: He is alert.         Consultants     Consult Orders (all) (From admission, onward)     Start     Ordered    11/15/21 0334  Inpatient Orthopedic Surgery Consult  Once        Specialty:  Orthopedic Surgery  Provider:  Lenny Caballero II, MD    11/15/21 0336    11/15/21 0241  [facility] (on-call MD unless specified) Details  Once        Specialty:  Hospitalist  Provider:  (Not yet assigned)    11/15/21 0240              Procedures     * Surgery not found *      Imaging Results (All)     Procedure Component Value Units Date/Time    CT Pelvis Without Contrast [[id number]] Collected: 11/15/21 0208     Updated: 11/15/21 0226    Narrative:      CT OF THE BONY PELVIS     HISTORY: Right hip pain after a fall     COMPARISON: None available.     TECHNIQUE: Axial CT imaging was obtained through the pelvis. Coronal and  sagittal reformatted images were obtained.     FINDINGS:  There is a nondisplaced fracture of the right sacral ala. There is a  comminuted right inferior pubic ramus fracture. There is a fracture of  the anterior wall of the right acetabulum, with extension into the right  superior pubic ramus. No extension into the iliac wing is seen. Soft  tissue windows demonstrate some hemorrhage along the right pelvic  sidewall. Large  amount of stool is seen within the rectum, which may  reflect impaction. Some additional hemorrhage is noted overlying the  right lateral thigh, and within the musculature adjacent to the right  inferior pubic radius. No fractures are identified on the left. There  are changes of prior inguinal hernia repair with mesh.       Impression:      There is a fracture of the right acetabulum, with extension into the  right superior pubic ramus. Patient is also noted to have a comminuted  fracture of the right inferior pubic ramus, as well as a fracture of the  right sacral ala.     Radiation dose reduction techniques were utilized, including automated  exposure control and exposure modulation based on body size.     This report was finalized on 11/15/2021 2:23 AM by Dr. Gaviota Bowen M.D.               Pertinent Labs     Results from last 7 days   Lab Units 11/16/21  0558 11/15/21  0739 11/15/21  0051   WBC 10*3/mm3 8.64 7.76 8.95   HEMOGLOBIN g/dL 10.2* 10.3*  10.3* 10.7*   PLATELETS 10*3/mm3 153 160 183     Results from last 7 days   Lab Units 11/15/21  0739 11/15/21  0051   SODIUM mmol/L 139 141   POTASSIUM mmol/L 3.8 3.9   CHLORIDE mmol/L 103 104   CO2 mmol/L 23.7 25.4   BUN mg/dL 19 19   CREATININE mg/dL 1.92* 2.04*   GLUCOSE mg/dL 113* 119*   EGFR IF NONAFRICN AM mL/min/1.73 34* 31*       Results from last 7 days   Lab Units 11/15/21  0739 11/15/21  0051   CALCIUM mg/dL 8.8 8.7               Invalid input(s): LDLCALC      Results from last 7 days   Lab Units 11/15/21  0324   COVID19  Not Detected       Test Results Pending at Discharge       Discharge Details        Discharge Medications      New Medications      Instructions Start Date   HYDROcodone-acetaminophen 5-325 MG per tablet  Commonly known as: NORCO   1 tablet, Oral, Every 6 Hours PRN         Continue These Medications      Instructions Start Date   apixaban 5 MG tablet tablet  Commonly known as: ELIQUIS   5 mg, Oral, 2 Times Daily, PER DR HURST TO  STOP 3 DAYS PRIOR TO SURGERY, PT REPORTED LAST DOSE 10-30-21      aspirin 81 MG EC tablet   81 mg, Oral, Daily      atorvastatin 40 MG tablet  Commonly known as: LIPITOR   40 mg, Oral, Nightly      ferrous sulfate 325 (65 FE) MG tablet   325 mg, Oral, Daily With Breakfast      fluticasone 50 MCG/ACT nasal spray  Commonly known as: FLONASE   1 spray, Nasal, Daily PRN      metoprolol succinate XL 25 MG 24 hr tablet  Commonly known as: TOPROL-XL   50 mg, Oral, 2 Times Daily      omeprazole 40 MG capsule  Commonly known as: priLOSEC   40 mg, Oral, Daily      vitamin B-12 500 MCG tablet  Commonly known as: CYANOCOBALAMIN   500 mcg, Oral, Daily             Allergies   Allergen Reactions   • Pollen Extract Shortness Of Breath       Discharge Disposition:  Skilled Nursing Facility ([place] - External)      Discharge Diet:  Diet Order   Procedures   • Diet Regular       Discharge Activity:   Activity Instructions     Activity as Tolerated      NWB          CODE STATUS:    Code Status and Medical Interventions:   Ordered at: 11/15/21 0336     Code Status (Patient has no pulse and is not breathing):    CPR (Attempt to Resuscitate)     Medical Interventions (Patient has pulse or is breathing):    Full Support       No future appointments.  Additional Instructions for the Follow-ups that You Need to Schedule     Discharge Follow-up with PCP   As directed       Currently Documented PCP:    Basilio Diaz MD    PCP Phone Number:    [phone]     Follow Up Details: 1-2 weeks         Discharge Follow-up with Specified Provider: Dr Caballero as directed   As directed      To: Dr Caballero as directed            Follow-up Information     Basilio Diaz MD .    Specialty: Family Medicine  Why: 1-2 weeks  Contact information:  [address] [place] [address]  [phone]                         Additional Instructions for the Follow-ups that You Need to Schedule     Discharge Follow-up with PCP   As directed       Currently Documented  PCP:    Basilio Diaz MD    PCP Phone Number:    [phone]     Follow Up Details: 1-2 weeks         Discharge Follow-up with Specified Provider: Dr Caballero as directed   As directed      To: Dr Caballero as directed           Time Spent on Discharge:  Greater than 30 minutes      Deven Gardner MD  [facility]  11/17/21  13:33 EST

## 2021-11-17 NOTE — PLAN OF CARE
Goal Outcome Evaluation:  Plan of Care Reviewed With: patient        Progress: no change  Outcome Summary: Pt with no complaints. Pleasantly confused. Large bruise on right thigh from fall. Plan to discharge back to [facility] tomorrow. Room air. VSS, will continue to monitor.

## 2021-11-17 NOTE — CASE MANAGEMENT/SOCIAL WORK
"Physicians Statement of Medical Necessity for  Ambulance Transportation    GENERAL INFORMATION     Name: Suhail Springer  YOB: 1935  Medicare #: [id number]  Transport Date: 11/18/21(Valid for round trips this date, or for scheduled repetitive trips for 60 days from the date signed below.)  Origin: [address]  Destination: [facility]  Is the Patient's stay covered under Medicare Part A (PPS/DRG?)Yes  Closest appropriate facility? Yes  If this a hosp-hosp transfer? No  Is this a hospice patient? No    MEDICAL NECESSITY QUESTIONAIRE    Ambulance Transportation is medically necessary only if other means of transportation are contraindicated or would be potentially harmful to the patient.  To meet this requirement, the patient must be either \"bed confined\" or suffer from a condition such that transport by means other than an ambulance is contraindicated by the patient's condition.  The following questions must be answered by the healthcare professional signing below for this form to be valid:     1) Describe the MEDICAL CONDITION (physical and/or mental) of this patient AT THE TIME OF AMBULANCE TRANSPORT that requires the patient to be transported in an ambulance, and why transport by other means is contraindicated by the patient's condition: Fracture right acetabulum with extension into right superior pubic ramus.  Past Medical History:   Diagnosis Date   • A-fib (HCC)     ASYMPTOMATIC- SEE'S DR HASSAN- DENIED CP/SOB   • Arthritis    • Cancer (HCC)     SKIN CANCER LESION    • Carotid stenosis     STENOSIS OF LEFT CAROTID    • CKD (chronic kidney disease)     MONITORED BY PCP TWICE A YEAR   • CVA, old, aphasia 10/07/2021   • Hyperlipidemia    • Hypertension    • Pacemaker    • Sleep apnea     NO CPAP   • Stroke (HCC)     OCTOBER 7, 2021- WENT TO REHAB- DISCHARGED 11-1-21- GENERALIZED WEAKNESS       Past Surgical History:   Procedure Laterality Date   • CAROTID ENDARTERECTOMY Left 11/3/2021    " "Procedure: LEFT CAROTID ENDARTERECTOMY WITH SHUNT, BOVINE PATCH;  Surgeon: lOegario Hall MD;  Location: [facility] MAIN OR;  Service: Vascular;  Laterality: Left;   • EXCISION LESION      PRE CANCERIOUS LESION REMOVED      2) Is this patient \"bed confined\" as defined below?Yes   To be \"bed confined\" the patient must satisfy all three of the following criteria:  (1) unable to get up from bed without assistance; AND (2) unable to ambulate;  AND (3) unable to sit in a chair or wheelchair.  3) Can this patient safely be transported by car or wheelchair van (I.e., may safely sit during transport, without an attendant or monitoring?)No   4. In addition to completing questions 1-3 above, please check any of the following conditions that apply*:          *Note: supporting documentation for any boxes checked must be maintained in the patient's medical records Patient is confused, Medical attendant required, Unable to tolerate seated position for time needed to transport and Other nonweightbearing with bed rest       SIGNATURE OF PHYSICIAN OR OTHER AUTHORIZED HEALTHCARE PROFESSIONAL    I certify that the above information is true and correct based on my evaluation of this patient, and represent that the patient requires transport by ambulance and that other forms of transport are contraindicated.  I understand that this information will be used by the Centers for Medicare and Medicaid Services (CMS) to support the determiniation of medical necessity for ambulance services, and I represent that I have personal knowledge of the patient's condition at the time of transport.     X   If this box is checked, I also certify that the patient is physically or mentally incapable of signing the ambulance service's claim form and that the institution with which I am affiliated has furnished care, services or assistance to the patient.  My signature below is made on behalf of the patient pursuant to 42 .36(b)(4). In accordance " with 42 .37, the specific reason(s) that the patient is physically or mentally incapable of signing the claim for is as follows:     Signature of Physician or Healthcare Professional    Shauna Ricci RN, [facility] Date/Time:    11/17/21 @ 1447     (For Scheduled repetitive transport, this form is not valid for transports performed more than 60 days after this date).                                                                                                                                            --------------------------------------------------------------------------------------------  Printed Name and Credentials of Physician or Authorized Healthcare Professional     *Form must be signed by patient's attending physician for scheduled, repetitive transports,.  For non-repetitive ambulance transports, if unable to obtain the signature of the attending physician, any of the following may sign (please select below):     Physician  Clinical Nurse Specialist  Registered Nurse     Physician Assistant  Discharge Planner  Licensed Practical Nurse     Nurse Practitioner

## 2021-11-18 VITALS
TEMPERATURE: 98.3 F | WEIGHT: 161 LBS | SYSTOLIC BLOOD PRESSURE: 150 MMHG | HEART RATE: 80 BPM | RESPIRATION RATE: 18 BRPM | BODY MASS INDEX: 25.27 KG/M2 | DIASTOLIC BLOOD PRESSURE: 76 MMHG | HEIGHT: 67 IN | OXYGEN SATURATION: 93 %

## 2021-11-18 RX ADMIN — DOCUSATE SODIUM 50MG AND SENNOSIDES 8.6MG 2 TABLET: 8.6; 5 TABLET, FILM COATED ORAL at 08:41

## 2021-11-18 RX ADMIN — Medication 500 MCG: at 08:41

## 2021-11-18 RX ADMIN — METOPROLOL SUCCINATE 50 MG: 50 TABLET, EXTENDED RELEASE ORAL at 08:41

## 2021-11-18 RX ADMIN — POLYETHYLENE GLYCOL 3350 17 G: 17 POWDER, FOR SOLUTION ORAL at 08:41

## 2021-11-18 RX ADMIN — PANTOPRAZOLE SODIUM 40 MG: 40 TABLET, DELAYED RELEASE ORAL at 06:11

## 2021-11-18 NOTE — PROGRESS NOTES
Dedicated to Hospital Care    [phone]   LOS: 3 days     Name: Suhail Springer  Age/Sex: 85 y.o. male  :  1935        PCP: Basilio Diaz MD  Chief Complaint   Patient presents with   • Hip Injury      Subjective   He feels okay today he is just pretty sore.  He denies new issues or complaints.  No chest pain patient shortness of breath fevers chills nausea vomiting  General: No Fever or Chills, Cardiac: No Chest Pain or Palpitations, Resp: No Cough or SOA, GI: No Nausea, Vomiting, or Diarrhea and Other: No bleeding    atorvastatin, 40 mg, Oral, Nightly  bisacodyl, 10 mg, Rectal, Daily  metoprolol succinate XL, 50 mg, Oral, BID  pantoprazole, 40 mg, Oral, QAM  polyethylene glycol, 17 g, Oral, TID  senna-docusate sodium, 2 tablet, Oral, BID  sodium chloride, 10 mL, Intravenous, Q12H  vitamin B-12, 500 mcg, Oral, Daily           Objective   Vital Signs  Temp:  [97.5 °F (36.4 °C)-99.2 °F (37.3 °C)] 98.3 °F (36.8 °C)  Heart Rate:  [72-81] 80  Resp:  [16-18] 18  BP: (124-150)/(68-83) 150/76  Body mass index is 25.22 kg/m².    Intake/Output Summary (Last 24 hours) at 2021 0954  Last data filed at 2021 0942  Gross per 24 hour   Intake 720 ml   Output 250 ml   Net 470 ml       Physical Exam  Vitals and nursing note reviewed.   Constitutional:       General: He is not in acute distress.     Appearance: Normal appearance.   Cardiovascular:      Rate and Rhythm: Normal rate and regular rhythm.   Pulmonary:      Effort: No respiratory distress.      Breath sounds: Normal breath sounds.   Abdominal:      General: Bowel sounds are normal. There is no distension.      Palpations: Abdomen is soft.   Neurological:      General: No focal deficit present.      Mental Status: He is alert and oriented to person, place, and time.           Results Review:       I reviewed the patient's new clinical results.  Results from last 7 days   Lab Units 21  0558 11/15/21  0739 11/15/21  0051   WBC 10*3/mm3 8.64  7.76 8.95   HEMOGLOBIN g/dL 10.2* 10.3*  10.3* 10.7*   PLATELETS 10*3/mm3 153 160 183     Results from last 7 days   Lab Units 11/15/21  0739 11/15/21  0051   SODIUM mmol/L 139 141   POTASSIUM mmol/L 3.8 3.9   CHLORIDE mmol/L 103 104   CO2 mmol/L 23.7 25.4   BUN mg/dL 19 19   CREATININE mg/dL 1.92* 2.04*   CALCIUM mg/dL 8.8 8.7   Estimated Creatinine Clearance: 30.1 mL/min (A) (by C-G formula based on SCr of 1.92 mg/dL (H)).      Assessment/Plan   Active Hospital Problems    Diagnosis  POA   • Fall at nursing home [W19.XXXA, Y92.129]  Yes   • Multiple pelvic fractures (HCC) [S32.82XA]  Unknown   • Atrial fibrillation (HCC) [I48.91]  Unknown   • History of stroke [Z86.73]  Not Applicable   • HLD (hyperlipidemia) [E78.5]  Unknown   • Stage 3b chronic kidney disease (HCC) [N18.32]  Unknown   • GERD (gastroesophageal reflux disease) [K21.9]  Unknown      Resolved Hospital Problems   No resolved problems to display.       PLAN  This is an 85-year-old gentleman with a history of chronic kidney disease A. fib gastroesophageal reflux disease and hyperlipidemia that presents after a fall and is found to have fractures of his right acetabulum, pubic rami and sacrum  -Plan for nonoperative management.  He will need to be nonweightbearing   -Likely to need physical therapy and Occupational Therapy   -We will see how the patient does with physical therapy today  -Probable plan to discharge back to his skilled nursing facility today  -I reviewed the imaging with the patient's son at the bedside as well as the patient  -All questions addressed and answered      Disposition  Plan discharge today        Deven Gardner MD  [facility]  11/18/21  09:54 EST

## 2021-11-18 NOTE — CASE MANAGEMENT/SOCIAL WORK
Case Management Discharge Note      Final Note: [facility] SNF per [facility]. Packet given to RN to complete. No additional needs noted.    Provided Post Acute Provider List?: N/A  N/A Provider List Comment: plans back to [facility]    Selected Continued Care - Discharged on 11/18/2021 Admission date: 11/15/2021 - Discharge disposition: Skilled Nursing Facility ([place] - External)    Destination Coordination complete.    Service Provider Selected Services Address Phone Fax Patient Preferred    [facility]  Skilled Nursing [address] , [facility] [address] [phone] --          Durable Medical Equipment    No services have been selected for the patient.              Dialysis/Infusion    No services have been selected for the patient.              Home Medical Care    No services have been selected for the patient.              Therapy    No services have been selected for the patient.              Community Resources    No services have been selected for the patient.              Community & DME    No services have been selected for the patient.                  Transportation Services  Ambulance: [facility]    Final Discharge Disposition Code: 03 - skilled nursing facility (SNF)

## 2021-12-02 NOTE — CASE MANAGEMENT/SOCIAL WORK
Continued Stay Note  [facility]     Patient Name: Suhail Springer  MRN: [id number]  Today's Date: 11/17/2021    Admit Date: 11/15/2021     Discharge Plan     Row Name 11/17/21 1456       Plan    Plan [facility]. Packet on chart. [facility] arranged for pick-up 11/18/21 @ 1115.    Patient/Family in Agreement with Plan yes    Plan Comments Discussed case with Dr. Gardner. Orders written for dc to facility. Call placed to [facility] to inform of discharge in am. Left voice message. Await call back. Packet on chart. [facility] arranged for pick-up 11/18/21 @ 1115. Continue to follow.....[facility]               Discharge Codes    No documentation.               Expected Discharge Date and Time     Expected Discharge Date Expected Discharge Time    Nov 17, 2021             Shauna Ricci RN     No

## 2021-12-17 ENCOUNTER — APPOINTMENT (OUTPATIENT)
Dept: CT IMAGING | Facility: HOSPITAL | Age: 86
End: 2021-12-17

## 2021-12-17 ENCOUNTER — HOSPITAL ENCOUNTER (EMERGENCY)
Facility: HOSPITAL | Age: 86
Discharge: NURSING, REHAB OR LONG-TERM CARE | End: 2021-12-17
Attending: EMERGENCY MEDICINE | Admitting: EMERGENCY MEDICINE

## 2021-12-17 VITALS
TEMPERATURE: 98.1 F | OXYGEN SATURATION: 98 % | HEART RATE: 68 BPM | DIASTOLIC BLOOD PRESSURE: 82 MMHG | SYSTOLIC BLOOD PRESSURE: 150 MMHG | WEIGHT: 156.53 LBS | RESPIRATION RATE: 16 BRPM | BODY MASS INDEX: 24.57 KG/M2 | HEIGHT: 67 IN

## 2021-12-17 DIAGNOSIS — S09.90XA INJURY OF HEAD, INITIAL ENCOUNTER: ICD-10-CM

## 2021-12-17 DIAGNOSIS — S00.83XA CONTUSION OF FACE, INITIAL ENCOUNTER: ICD-10-CM

## 2021-12-17 DIAGNOSIS — Z79.01 CHRONIC ANTICOAGULATION: ICD-10-CM

## 2021-12-17 DIAGNOSIS — S01.81XA FACE LACERATIONS, INITIAL ENCOUNTER: Primary | ICD-10-CM

## 2021-12-17 PROCEDURE — 72125 CT NECK SPINE W/O DYE: CPT

## 2021-12-17 PROCEDURE — 90471 IMMUNIZATION ADMIN: CPT | Performed by: PHYSICIAN ASSISTANT

## 2021-12-17 PROCEDURE — 99283 EMERGENCY DEPT VISIT LOW MDM: CPT

## 2021-12-17 PROCEDURE — 25010000002 TETANUS-DIPHTH-ACELL PERTUSSIS 5-2.5-18.5 LF-MCG/0.5 SUSPENSION PREFILLED SYRINGE: Performed by: PHYSICIAN ASSISTANT

## 2021-12-17 PROCEDURE — 70486 CT MAXILLOFACIAL W/O DYE: CPT

## 2021-12-17 PROCEDURE — 70450 CT HEAD/BRAIN W/O DYE: CPT

## 2021-12-17 PROCEDURE — 90715 TDAP VACCINE 7 YRS/> IM: CPT | Performed by: PHYSICIAN ASSISTANT

## 2021-12-17 RX ORDER — BACITRACIN ZINC AND POLYMYXIN B SULFATE 500; 10000 [USP'U]/G; [USP'U]/G
OINTMENT OPHTHALMIC
Qty: 3.5 G | Refills: 0 | Status: SHIPPED | OUTPATIENT
Start: 2021-12-17 | End: 2021-12-17 | Stop reason: SDUPTHER

## 2021-12-17 RX ORDER — BACITRACIN ZINC AND POLYMYXIN B SULFATE 500; 10000 [USP'U]/G; [USP'U]/G
OINTMENT OPHTHALMIC
Qty: 3.5 G | Refills: 0 | Status: SHIPPED | OUTPATIENT
Start: 2021-12-17

## 2021-12-17 RX ORDER — LIDOCAINE HYDROCHLORIDE AND EPINEPHRINE 10; 10 MG/ML; UG/ML
10 INJECTION, SOLUTION INFILTRATION; PERINEURAL ONCE
Status: COMPLETED | OUTPATIENT
Start: 2021-12-17 | End: 2021-12-17

## 2021-12-17 RX ADMIN — LIDOCAINE HYDROCHLORIDE,EPINEPHRINE BITARTRATE 10 ML: 10; .01 INJECTION, SOLUTION INFILTRATION; PERINEURAL at 20:59

## 2021-12-17 RX ADMIN — TETANUS TOXOID, REDUCED DIPHTHERIA TOXOID AND ACELLULAR PERTUSSIS VACCINE, ADSORBED 0.5 ML: 5; 2.5; 8; 8; 2.5 SUSPENSION INTRAMUSCULAR at 21:29

## 2021-12-18 NOTE — CASE MANAGEMENT/SOCIAL WORK
Contacted [facility] to arrange ambulance transport back to [facility]. Awaiting return call with TORRES Bergman RN

## 2021-12-18 NOTE — ED PROVIDER NOTES
EMERGENCY DEPARTMENT ENCOUNTER    Room Number:  08/08  Date seen:  12/17/2021  Time seen: 22:46 EST  PCP: Basilio Diaz MD  Historian: EMS report      HPI:  Chief Complaint: Head injury    A complete HPI/ROS/PMH/PSH/SH/FH are unobtainable due to: Dementia    Context: Suhail Springer is a 85 y.o. male who presents to the ER via EMS from the nursing home to the ED for evaluation of head injury.  It was unwitnessed.  Patient cannot recall the details of the event.  He denies any complaints.        PAST MEDICAL HISTORY  Active Ambulatory Problems     Diagnosis Date Noted   • Cerebrovascular accident (CVA) due to stenosis of precerebral artery (HCC) 10/28/2021   • Stenosis of left carotid artery 10/28/2021   • Fall at nursing home 11/15/2021   • Multiple pelvic fractures (HCC) 11/15/2021   • Atrial fibrillation ([facility]) 11/15/2021   • History of stroke 11/15/2021   • HLD (hyperlipidemia) 11/15/2021   • Stage 3b chronic kidney disease ([facility]) 11/15/2021   • GERD (gastroesophageal reflux disease) 11/15/2021     Resolved Ambulatory Problems     Diagnosis Date Noted   • No Resolved Ambulatory Problems     Past Medical History:   Diagnosis Date   • A-fib ([facility])    • Arthritis    • Cancer ([facility])    • Carotid stenosis    • CKD (chronic kidney disease)    • CVA, old, aphasia 10/07/2021   • Hyperlipidemia    • Hypertension    • Pacemaker    • Sleep apnea    • Stroke (HCC)          PAST SURGICAL HISTORY  Past Surgical History:   Procedure Laterality Date   • CAROTID ENDARTERECTOMY Left 11/3/2021    Procedure: LEFT CAROTID ENDARTERECTOMY WITH SHUNT, BOVINE PATCH;  Surgeon: Olegario Hall MD;  Location: [facility] MAIN OR;  Service: Vascular;  Laterality: Left;   • EXCISION LESION      PRE CANCERIOUS LESION REMOVED         FAMILY HISTORY  History reviewed. No pertinent family history.      SOCIAL HISTORY  Social History     Socioeconomic History   • Marital status:    Tobacco Use   • Smoking status: Former Smoker     Types:  Cigarettes   • Smokeless tobacco: Never Used   • Tobacco comment: QUIT 40 YEARS AGO   Vaping Use   • Vaping Use: Never used   Substance and Sexual Activity   • Alcohol use: Never   • Drug use: Never   • Sexual activity: Defer         ALLERGIES  Pollen extract        REVIEW OF SYSTEMS  Review of Systems   Unable to perform ROS: Dementia        PHYSICAL EXAM  ED Triage Vitals [12/17/21 1931]   Temp Heart Rate Resp BP SpO2   98.1 °F (36.7 °C) 78 18 154/87 98 %      Temp src Heart Rate Source Patient Position BP Location FiO2 (%)   Tympanic -- -- -- --         GENERAL: not distressed  HENT: Large left frontal hematoma and left periorbital ecchymosis.  Irregular but mostly linear laceration approximately 3.5 cm to the left eyebrow.  1.5 cm laceration lateral to left eyebrow.  Small stellate skin tear in the left temporal area.  No hemotympanum nichols sign raccoon eyes septal hematoma otorrhea or rhinorrhea.  EYES: no scleral icterus, PERRL, extraocular movements intact, no hyphema or subconjunctival hemorrhage.  CV: regular rhythm, regular rate  RESPIRATORY: normal effort CTA B  ABDOMEN: soft, nontender  MUSCULOSKELETAL: no deformity.  No C, T, L-spine.  Extremities nontender  and have age-appropriate range of motion.  NEURO: alert, moves all extremities, follows commands  SKIN: warm, dry    Vital signs and nursing notes reviewed.            RADIOLOGY  CT Head Without Contrast   Final Result   1. Soft tissue swelling and hematoma around the left for head and   periorbital region. No evidence of skull fracture, intracranial injury,   or orbital soft tissue injury.   2. Aging infarctions in the watershed areas along the anterior and   posterior aspect of the left cerebral hemisphere likely relates to the   stroke in that from late October diagnosed and managed elsewhere.   3. No evidence of fracture in the face of the cervical spine.       This report was finalized on 12/17/2021 10:21 PM by Dr. Farhan Gustafson M.D.           CT Cervical Spine Without Contrast   Final Result   1. Soft tissue swelling and hematoma around the left for head and   periorbital region. No evidence of skull fracture, intracranial injury,   or orbital soft tissue injury.   2. Aging infarctions in the watershed areas along the anterior and   posterior aspect of the left cerebral hemisphere likely relates to the   stroke in that from late October diagnosed and managed elsewhere.   3. No evidence of fracture in the face of the cervical spine.       This report was finalized on 12/17/2021 10:21 PM by Dr. Farhan Gustafson M.D.          CT Facial Bones Without Contrast   Final Result   1. Soft tissue swelling and hematoma around the left for head and   periorbital region. No evidence of skull fracture, intracranial injury,   or orbital soft tissue injury.   2. Aging infarctions in the watershed areas along the anterior and   posterior aspect of the left cerebral hemisphere likely relates to the   stroke in that from late October diagnosed and managed elsewhere.   3. No evidence of fracture in the face of the cervical spine.       This report was finalized on 12/17/2021 10:21 PM by Dr. Farhan Gustafson M.D.              I ordered the above noted radiological studies. Reviewed by me and discussed with radiologist.  See dictation for official radiology interpretation.    PROCEDURES  Laceration Repair    Date/Time: 12/17/2021 11:39 PM  Performed by: Jaquelin Youngblood PA  Authorized by: Chente Velasquez II, MD     Consent:     Consent obtained:  Verbal    Consent given by:  Patient    Risks discussed:  Infection, pain, poor wound healing, poor cosmetic result, need for additional repair and vascular damage    Alternatives discussed:  No treatment  Anesthesia (see MAR for exact dosages):     Anesthesia method:  Local infiltration    Local anesthetic:  Lidocaine 1% WITH epi  Laceration details:     Location:  Face    Face location:  L eyebrow    Length (cm):   3.5  Repair type:     Repair type:  Complex (due to copious bleeding from anticoagulation)  Pre-procedure details:     Preparation:  Imaging obtained to evaluate for foreign bodies  Exploration:     Hemostasis achieved with:  Epinephrine and direct pressure    Wound exploration: wound explored through full range of motion and entire depth of wound probed and visualized      Wound extent: no fascia violation noted, no foreign bodies/material noted, no muscle damage noted, no nerve damage noted, no tendon damage noted and no underlying fracture noted      Contaminated: no    Treatment:     Area cleansed with:  Hibiclens    Amount of cleaning:  Standard    Irrigation solution:  Sterile saline    Irrigation method:  Syringe    Debridement:  None    Undermining:  None    Scar revision: no    Skin repair:     Repair method:  Sutures    Suture size:  5-0    Suture material:  Nylon    Suture technique:  Simple interrupted and horizontal mattress    Number of sutures:  10  Approximation:     Approximation:  Close  Post-procedure details:     Dressing:  Open (no dressing)    Patient tolerance of procedure:  Tolerated well, no immediate complications  Laceration Repair    Date/Time: 12/17/2021 11:51 PM  Performed by: Jaquelin Youngblood PA  Authorized by: Chente Velasquez II, MD     Consent:     Consent obtained:  Verbal    Consent given by:  Patient    Alternatives discussed:  No treatment  Anesthesia (see MAR for exact dosages):     Anesthesia method:  None  Laceration details:     Location:  Face    Face location:  L eyebrow    Length (cm):  1.5  Repair type:     Repair type:  Simple  Pre-procedure details:     Preparation:  Patient was prepped and draped in usual sterile fashion  Exploration:     Hemostasis achieved with:  Direct pressure and epinephrine    Wound exploration: wound explored through full range of motion      Wound extent: no fascia violation noted, no foreign bodies/material noted, no muscle damage noted,  no nerve damage noted, no tendon damage noted, no underlying fracture noted and no vascular damage noted      Contaminated: no    Treatment:     Area cleansed with:  Hibiclens    Amount of cleaning:  Standard    Irrigation solution:  Sterile saline    Irrigation method:  Syringe  Skin repair:     Repair method:  Tissue adhesive  Approximation:     Approximation:  Close  Post-procedure details:     Dressing:  Open (no dressing)    Patient tolerance of procedure:  Tolerated well, no immediate complications            MEDICATIONS GIVEN IN ER  Medications   Tetanus-Diphth-Acell Pertussis (BOOSTRIX) injection 0.5 mL (0.5 mL Intramuscular Given 12/17/21 2129)   lidocaine 1% - EPINEPHrine 1:028674 (XYLOCAINE W/EPI) 1 %-1:265703 injection 10 mL (10 mL Injection Given by Other 12/17/21 2059)             PROGRESS AND CONSULTS    DDX includes but not limited to skull fracture, brain bleed, facial fracture, head contusion       Patient CT imaging is unremarkable for any acute intracranial findings as well as any acute traumatic findings in the C-spine or to the facial bones.  Laceration repaired and hemostasis, the challenging, was achieved.  I prescribed Polysporin ophthalmic ointment to be used on the periorbital wounds.  Instructions for wound care, follow-up for suture removal given and he stable for discharge.        Patient was placed in face mask in first look. Patient was wearing facemask each time I entered the room and throughout our encounter. I wore protective equipment throughout this patient encounter including a face mask, eye shield and gloves. Hand hygiene was performed before donning protective equipment and after removal when leaving the room.        DIAGNOSIS  Final diagnoses:   Face lacerations, initial encounter   Injury of head, initial encounter   Contusion of face, initial encounter   Chronic anticoagulation         Follow Up:  Basilio Diaz MD  [address] [facility]  [address]  [phone]    In 1 week  suture removal      RX:     Medication List      New Prescriptions    bacitracin-polymyxin b [id number] UNIT/GM ophthalmic ointment  Commonly known as: POLYSPORIN  Apply small amount to wounds around left eye 2 times daily           Where to Get Your Medications      You can get these medications from any pharmacy    Bring a paper prescription for each of these medications  · bacitracin-polymyxin b [id number] UNIT/GM ophthalmic ointment           Latest Documented Vital Signs:  As of 23:53 EST  BP- 150/82 HR- 68 Temp- 98.1 °F (36.7 °C) (Tympanic) O2 sat- 98%       Jaquelin Youngblood PA  12/17/21 6511

## 2021-12-18 NOTE — ED NOTES
This RN attempted to call report to [facility], spoke with Adeline. Pt RN was on break, I informed Adeline that the RN can call back with any questions.      Rosalva Kaur, RN  12/17/21 4792

## 2021-12-18 NOTE — ED NOTES
Pt to triage from [facility] via [phone] with c/o unwitnessed fall.  Unsure of reason for fall, pt denies loc.  Pt is on eliquis.  Pt has laceration to left upper eyelid Pt history of stroke with speech deficits.  Pt wearing mask in triage.  Triage personnel wore appropriate PPE       Joslyn Duque, RN  12/17/21 1933

## 2021-12-18 NOTE — ED NOTES
Patient was placed in face mask during first look triage.  Patient was wearing a face mask throughout encounter.  I wore personal protective equipment throughout the encounter.  Hand hygiene was performed before and after patient encounter.    Pt brought back from triage with direct pressure to left eye (Inner canthus) by RN. Pt placed in bed and then we placed surgicel, thrombipad, as well as 4x4's, all held in place with direct pressure from an ace bandage. Bleeding controled at this time. Pt seems to be [place], alert and oriented.        Rosalva Kaur, RN  12/17/21 2041

## 2021-12-18 NOTE — ED TRIAGE NOTES
Checked on facial bandage - pt now profusely bleeding from left eyelid laceration - direct pressure held, surgicel and thrombipad placed without cessation of bleeding. Pt taken to room 8 and new surgicel and thrombipad as well as bulky dressing and ace bandage placed to tamponade bleeding.  Pt tolerated procedures well.

## 2021-12-18 NOTE — ED PROVIDER NOTES
MD ATTESTATION NOTE    The JULIANO and I have discussed this patient's history, physical exam, and treatment plan.  I have reviewed the documentation and personally had a face to face interaction with the patient. I affirm the documentation and agree with the treatment and plan.  The attached note describes my personal findings.      Suhail Springer is a 85 y.o. male who presents to the ED c/o fall.  This was unwitnessed.      On exam:  Laceration to the left upper eyelid/eyebrow  No gross hyphema  Scalp is otherwise atraumatic  No chest wall tenderness  No pain to palpation of the 4 extremities  No pain with passive range of motion of the hips    Labs  No results found for this or any previous visit (from the past 24 hour(s)).    Radiology  No Radiology Exams Resulted Within Past 24 Hours    Medical Decision Making:           PPE: Both the patient and I wore a surgical mask throughout the entire patient encounter. I wore protective goggles.     Diagnosis  Final diagnoses:   Face lacerations, initial encounter   Injury of head, initial encounter   Contusion of face, initial encounter   Chronic anticoagulation        Chente Velasquez II, MD  12/19/21 0028

## 2021-12-25 ENCOUNTER — APPOINTMENT (OUTPATIENT)
Dept: CT IMAGING | Facility: HOSPITAL | Age: 86
End: 2021-12-25

## 2021-12-25 ENCOUNTER — HOSPITAL ENCOUNTER (EMERGENCY)
Facility: HOSPITAL | Age: 86
Discharge: NURSING, REHAB OR LONG-TERM CARE | End: 2021-12-25
Attending: EMERGENCY MEDICINE | Admitting: EMERGENCY MEDICINE

## 2021-12-25 VITALS
BODY MASS INDEX: 21.47 KG/M2 | HEIGHT: 70 IN | DIASTOLIC BLOOD PRESSURE: 91 MMHG | WEIGHT: 150 LBS | SYSTOLIC BLOOD PRESSURE: 154 MMHG | RESPIRATION RATE: 16 BRPM | TEMPERATURE: 98.4 F | HEART RATE: 70 BPM | OXYGEN SATURATION: 96 %
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
## 2021-12-25 DIAGNOSIS — S00.83XA CONTUSION OF FACE, INITIAL ENCOUNTER: ICD-10-CM

## 2021-12-25 DIAGNOSIS — H05.232 PERIORBITAL HEMATOMA OF LEFT EYE: ICD-10-CM

## 2021-12-25 DIAGNOSIS — Z79.01 ANTICOAGULATED: ICD-10-CM

## 2021-12-25 PROCEDURE — 99283 EMERGENCY DEPT VISIT LOW MDM: CPT

## 2021-12-25 PROCEDURE — 70450 CT HEAD/BRAIN W/O DYE: CPT

## 2021-12-25 PROCEDURE — 70486 CT MAXILLOFACIAL W/O DYE: CPT

## 2021-12-25 PROCEDURE — 72125 CT NECK SPINE W/O DYE: CPT

## 2021-12-25 NOTE — ED NOTES
Unwitnessed fall today. Pt hit his head- is on eliqus.   Facility unable to determine baseline.   A&O to self currently. Pt had recent stroke with R sided deficits remaining.     Patient was placed in face mask during triage process. Patient was wearing facemask when I entered the room and throughout our encounter. I wore full protective equipment throughout this patient encounter including a face mask, eye protection, and gloves. Hand hygiene was performed before donning protective equipment and again following doffing of PPE after leaving the room.       Shanell Arizmendi, FELICE  12/25/21 9110

## 2021-12-26 NOTE — DISCHARGE INSTRUCTIONS
Return to the emergency department for decreased level of consciousness, vomiting, or other concern

## 2021-12-26 NOTE — ED NOTES
Report given to NBA Livingston, at [facility]. [phone].     Jeannette Ruiz RN  12/25/21 2139

## 2021-12-26 NOTE — ED PROVIDER NOTES
EMERGENCY DEPARTMENT ENCOUNTER    Room Number:  25/25  Date of encounter:  12/25/2021  PCP: Basilio Diaz MD  Historian: EMS, nursing home records    I used full protective equipment while examining this patient.  This includes face mask, gloves and protective eyewear.  I washed my hands before entering the room and immediately upon leaving the room.  Patient was wearing a surgical mask.      HPI:  Chief Complaint: Head injury  A complete HPI/ROS/PMH/PSH/SH/FH are unobtainable due to: Dementia    Context: Suhail Springer is a 86 y.o. male who presents to the ED from the nursing home by EMS after having an unwitnessed fall earlier today.  Patient is on Eliquis.  He recently had a stroke and has residual right-sided deficits.  Patient has dementia and cannot provide any meaningful history.      PAST MEDICAL HISTORY  Active Ambulatory Problems     Diagnosis Date Noted   • Cerebrovascular accident (CVA) due to stenosis of precerebral artery (HCC) 10/28/2021   • Stenosis of left carotid artery 10/28/2021   • Fall at nursing home 11/15/2021   • Multiple pelvic fractures (HCC) 11/15/2021   • Atrial fibrillation (HCC) 11/15/2021   • History of stroke 11/15/2021   • HLD (hyperlipidemia) 11/15/2021   • Stage 3b chronic kidney disease (HCC) 11/15/2021   • GERD (gastroesophageal reflux disease) 11/15/2021     Resolved Ambulatory Problems     Diagnosis Date Noted   • No Resolved Ambulatory Problems     Past Medical History:   Diagnosis Date   • A-fib (HCC)    • Arthritis    • Cancer (HCC)    • Carotid stenosis    • CKD (chronic kidney disease)    • CVA, old, aphasia 10/07/2021   • Hyperlipidemia    • Hypertension    • Pacemaker    • Sleep apnea    • Stroke (HCC)          PAST SURGICAL HISTORY  Past Surgical History:   Procedure Laterality Date   • CAROTID ENDARTERECTOMY Left 11/3/2021    Procedure: LEFT CAROTID ENDARTERECTOMY WITH SHUNT, BOVINE PATCH;  Surgeon: Olegario Hall MD;  Location: [facility] MAIN OR;  Service:  Vascular;  Laterality: Left;   • EXCISION LESION      PRE CANCERIOUS LESION REMOVED         FAMILY HISTORY  No family history on file.      SOCIAL HISTORY  Social History     Socioeconomic History   • Marital status:    Tobacco Use   • Smoking status: Former Smoker     Types: Cigarettes   • Smokeless tobacco: Never Used   • Tobacco comment: QUIT 40 YEARS AGO   Vaping Use   • Vaping Use: Never used   Substance and Sexual Activity   • Alcohol use: Never   • Drug use: Never   • Sexual activity: Defer         ALLERGIES  Pollen extract       REVIEW OF SYSTEMS  Review of Systems   Unobtainable secondary to dementia      PHYSICAL EXAM    I have reviewed the triage vital signs and nursing notes.    ED Triage Vitals   Temp Heart Rate Resp BP SpO2   12/25/21 1827 12/25/21 1825 12/25/21 1825 12/25/21 1825 12/25/21 1825   98.4 °F (36.9 °C) 93 18 151/86 96 %      Temp src Heart Rate Source Patient Position BP Location FiO2 (%)   -- -- -- -- --              Physical Exam  GENERAL: Awake, oriented x0  HENT: There is a sutured left brow laceration with overlying dried blood.  There is a left periorbital hematoma.  There is bruising over the left lower face.  NECK: supple, C-spine is nontender  EYES: Pupils reactive to light bilaterally  CV: regular rhythm, regular rate  RESPIRATORY: normal effort, clear to auscultation bilaterally  ABDOMEN: soft, nontender  MUSCULOSKELETAL: Extremities are nontender and without obvious deformity.  Pelvis is stable.  Chest wall is nontender  NEURO: Moves all extremities.  Does not follow commands  SKIN: warm, dry, no rash        LAB RESULTS  No results found for this or any previous visit (from the past 24 hour(s)).    Ordered the above labs and independently reviewed the results.      RADIOLOGY  CT Head Without Contrast, CT Facial Bones Without Contrast    Result Date: 12/25/2021  CT HEAD WITHOUT CONTRAST: CT FACIAL BONES  HISTORY: Fall with head injury  COMPARISON: 12/17/2021  TECHNIQUE:  Axial CT imaging was obtained through the brain. No IV contrast was administered. Axial CT imaging was obtained through the facial bones. Coronal and sagittal reformatted images were obtained.  FINDINGS: CT HEAD: No acute intracranial hemorrhage is seen. There is diffuse atrophy. There is periventricular and deep white matter microangiopathic change. There is encephalomalacia noted within the left frontal lobe. Additional areas of encephalomalacia are noted within the left parietal-occipital region. The appearance is stable when compared to prior exam. No calvarial fracture is seen.  CT FACIAL BONES: No facial bone fractures are seen. The patient has left supraorbital and periorbital soft tissue swelling. Similar findings were present on 12/17/2021. It actually appears improved when compared to the prior exam. Vague orbits appear intact. Mucosal thickening is noted within the ethmoid and right maxillary sinuses. Mastoid air cells appear clear. There are advanced degenerative changes of the right TMJ. Screws are identified within the mandible.       1. No acute intracranial findings. 2. No facial bone fractures are seen. 3. Left supraorbital and periorbital soft tissue swelling is overall improved.  Radiation dose reduction techniques were utilized, including automated exposure control and exposure modulation based on body size.  This report was finalized on 12/25/2021 8:08 PM by Dr. Gaviota Bowen M.D.      CT Cervical Spine Without Contrast    Result Date: 12/25/2021  CT OF THE CERVICAL SPINE  HISTORY: Fall  COMPARISON: 12/17/2021  TECHNIQUE: Axial CT imaging was obtained through the cervical spine. Coronal and sagittal reformatted images were obtained.  FINDINGS: No acute fracture or subluxation of the cervical spine is seen. The patient is noted to have mild retrolisthesis of C3 on C4. There is anterolisthesis of C4 on C5. There is retrolisthesis of C5 on C6. This appearance is stable when compared to prior  exam. Intervertebral disc space narrowing is noted most significantly at C5-C6. There are advanced degenerative changes involving the right TMJ.  C2-C3: There is no canal stenosis or neural foraminal narrowing. C3-C4: There is moderate canal stenosis. There is bilateral neural foraminal narrowing, more significant on the right. C4-C5: There is mild canal narrowing. There is bilateral neural foraminal narrowing. C5-C6: There is mild to moderate canal narrowing. There is severe bilateral neural foraminal narrowing. C6-C7: There is no canal stenosis or neural foraminal narrowing. C7-T1: There is no canal stenosis or neural foraminal narrowing.  Images through the lung apices do not demonstrate any acute abnormalities.      No acute fracture or subluxation identified.  Radiation dose reduction techniques were utilized, including automated exposure control and exposure modulation based on body size.  This report was finalized on 12/25/2021 8:18 PM by Dr. Gaviota Bowen M.D.        I ordered the above noted radiological studies. Reviewed by me and discussed with radiologist.  See dictation for official radiology interpretation.      PROCEDURES  Procedures      MEDICATIONS GIVEN IN ER    Medications - No data to display      PROGRESS, DATA ANALYSIS, CONSULTS, AND MEDICAL DECISION MAKING    All labs have been independently reviewed by me.  All radiology studies have been reviewed by me and discussed with radiologist dictating the report.   EKG's independently viewed and interpreted by me.  I have reviewed the nurse's notes, vital signs, past medical history, and medication list.  Discussion below represents my analysis of pertinent findings related to patient's condition, differential diagnosis, treatment plan and final disposition.      ED Course as of 12/25/21 2135   Sat Dec 25, 2021   2029 Head CT is negative acute. C-spine CT is negative for fracture and is unchanged. CT of the facial bones shows improved left  periorbital soft tissue swelling and no fracture [WH]   2031 Old records reviewed. Patient was seen here in the ED on 12/17/2021 after falling and hitting his head. Imaging studies were negative acute. He had a left brow laceration which was repaired. [WH]   2037 Patient remains awake and alert. He is resting comfortably. He does not have any new lacerations that need to be repaired. Patient will be discharged back to the nursing home. [WH]      ED Course User Index  [] Ishmael France MD       AS OF 21:35 EST VITALS:    BP - 154/91  HR - 70  TEMP - 98.4 °F (36.9 °C)  O2 SATS - 96%      DIAGNOSIS  Final diagnoses:   Fall, initial encounter   Periorbital hematoma of left eye   Contusion of face, initial encounter   Anticoagulated         DISPOSITION  Discharge    DISCHARGE    Patient discharged in stable condition.    Reviewed implications of results, diagnosis, meds, responsibility to follow up, warning signs and symptoms of possible worsening, potential complications and reasons to return to ER, including worsening confusion, decreased level of consciousness, vomiting, or other concern.    Patient/Family voiced understanding of above instructions.    Discussed plan for discharge, as there is no emergent indication for admission. Patient referred to primary care provider for BP management due to today's BP. Pt/family is agreeable and understands need for follow up and repeat testing.  Pt is aware that discharge does not mean that nothing is wrong but it indicates no emergency is present that requires admission and they must continue care with follow-up as given below or physician of their choice.     FOLLOW-UP  Basilio Diaz MD  [address] [id number]  [phone]               Medication List      No changes were made to your prescriptions during this visit.           Dictated utilizing Dragon dictation:  Much of this encounter note is an electronic transcription/translation of spoken  language to printed text. The electronic translation of spoken language may permit erroneous, or at times, nonsensical words or phrases to be inadvertently transcribed; Although I have reviewed the note for such errors, some may still exist.     Ishmael France MD  12/25/21 5162

## 2022-04-14 ENCOUNTER — HOSPITAL ENCOUNTER (EMERGENCY)
Dept: HOSPITAL 49 - FER | Age: 87
Discharge: HOME | End: 2022-04-14
Payer: MEDICARE

## 2022-04-14 DIAGNOSIS — Z23: ICD-10-CM

## 2022-04-14 DIAGNOSIS — I69.331: ICD-10-CM

## 2022-04-14 DIAGNOSIS — I69.311: ICD-10-CM

## 2022-04-14 DIAGNOSIS — S01.01XA: ICD-10-CM
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
## 2022-04-14 DIAGNOSIS — S01.81XA: Primary | ICD-10-CM
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
## 2022-08-08 ENCOUNTER — HOSPITAL ENCOUNTER (EMERGENCY)
Dept: HOSPITAL 49 - FER | Age: 87
Discharge: HOME | End: 2022-08-08
Payer: MEDICARE

## 2022-08-08 DIAGNOSIS — Z86.73: ICD-10-CM

## 2022-08-08 DIAGNOSIS — Z20.822: ICD-10-CM

## 2022-08-08 DIAGNOSIS — I95.9: Primary | ICD-10-CM

## 2022-08-08 DIAGNOSIS — Z79.01: ICD-10-CM

## 2022-08-08 LAB
ALBUMIN SERPL-MCNC: 2.7 G/DL (ref 3.4–5)
ALKALINE PHOSHATASE: 79 U/L (ref 46–116)
ALT SERPL-CCNC: 18 U/L (ref 16–63)
AST: 19 U/L (ref 15–37)
BASOPHIL: 0.6 % (ref 0–2)
BILIRUBIN - TOTAL: 0.4 MG/DL (ref 0.2–1)
BUN SERPL-MCNC: 29 MG/DL (ref 7–18)
BUN/CREAT RATIO (CALC): 13.1 RATIO
CHLORIDE: 104 MMOL/L (ref 98–107)
CO2 (BICARBONATE): 27 MMOL/L (ref 21–32)
CORONAVIRUS 2019 SARS-COV-2: NEGATIVE
CREATININE: 2.22 MG/DL (ref 0.67–1.17)
EOSINOPHIL: 1.5 % (ref 0–7)
FT4 (FREE T4): 1.2 NG/DL (ref 0.76–1.46)
GLOBULIN (CALCULATION): 5 G/DL
GLUCOSE SERPL-MCNC: 135 MG/DL (ref 74–106)
HCT: 34.8 % (ref 42–52)
HGB BLD-MCNC: 11.4 G/DL (ref 13.2–18)
INFLUENZA A NAA: NEGATIVE
INR PPP: 1.41 (ref 0.9–1.2)
LACTIC ACID: 2.6 MMOL/L (ref 0.4–1.9)
LYMPHOCYTE: 19.4 % (ref 15–48)
MCH RBC QN AUTO: 31.4 PG (ref 25–31)
MCHC RBC AUTO-ENTMCNC: 32.8 G/DL (ref 32–36)
MCV: 95.9 FL (ref 78–100)
MONOCYTE: 7.9 % (ref 0–12)
MPV: 9.3 FL (ref 6–9.5)
NEUTROPHIL: 70.3 % (ref 41–80)
NRBC: 0
PLT: 220 K/UL (ref 150–400)
POTASSIUM: 4.5 MMOL/L (ref 3.5–5.1)
PROTHROMBIN TIME: 16.8 SECONDS (ref 11.9–13.9)
PTT: 32.2 SECONDS (ref 24.9–34.6)
RBC MORPHOLOGY: NORMAL
RBC: 3.63 M/UL (ref 4.7–6)
RDW: 13.1 % (ref 11.5–14)
TOTAL PROTEIN: 7.7 G/DL (ref 6.4–8.2)
WBC: 7.1 K/UL (ref 4–10.5)

## 2022-08-08 PROCEDURE — U0002 COVID-19 LAB TEST NON-CDC: HCPCS

## 2022-10-01 ENCOUNTER — CLINICAL SUPPORT (OUTPATIENT)
Dept: FAMILY MEDICINE CLINIC | Age: 87
End: 2022-10-01

## 2022-10-01 DIAGNOSIS — Z23 NEED FOR INFLUENZA VACCINATION: Primary | ICD-10-CM

## 2022-10-01 PROCEDURE — G0008 ADMIN INFLUENZA VIRUS VAC: HCPCS | Performed by: PHYSICIAN ASSISTANT

## 2022-10-01 PROCEDURE — 90662 IIV NO PRSV INCREASED AG IM: CPT | Performed by: PHYSICIAN ASSISTANT

## 2023-03-06 ENCOUNTER — HOSPITAL ENCOUNTER (EMERGENCY)
Facility: HOSPITAL | Age: 88
Discharge: HOME | End: 2023-03-06
Attending: EMERGENCY MEDICINE | Admitting: EMERGENCY MEDICINE
Payer: MEDICARE

## 2023-03-06 ENCOUNTER — APPOINTMENT (OUTPATIENT)
Dept: GENERAL RADIOLOGY | Facility: HOSPITAL | Age: 88
End: 2023-03-06
Payer: MEDICARE

## 2023-03-06 VITALS
SYSTOLIC BLOOD PRESSURE: 127 MMHG | HEART RATE: 83 BPM | RESPIRATION RATE: 17 BRPM | OXYGEN SATURATION: 99 % | DIASTOLIC BLOOD PRESSURE: 80 MMHG | TEMPERATURE: 98.1 F

## 2023-03-06 DIAGNOSIS — I48.91 ATRIAL FIBRILLATION WITH RAPID VENTRICULAR RESPONSE: Primary | ICD-10-CM

## 2023-03-06 LAB
ALBUMIN SERPL-MCNC: 2.9 G/DL (ref 3.5–5.2)
ALBUMIN/GLOB SERPL: 0.7 G/DL
ALP SERPL-CCNC: 74 U/L (ref 39–117)
ALT SERPL W P-5'-P-CCNC: 13 U/L (ref 1–41)
ANION GAP SERPL CALCULATED.3IONS-SCNC: 8.5 MMOL/L (ref 5–15)
AST SERPL-CCNC: 20 U/L (ref 1–40)
BASOPHILS # BLD AUTO: 0.04 10*3/MM3 (ref 0–0.2)
BASOPHILS NFR BLD AUTO: 0.5 % (ref 0–1.5)
BILIRUB SERPL-MCNC: 0.2 MG/DL (ref 0–1.2)
BILIRUB UR QL STRIP: NEGATIVE
BUN SERPL-MCNC: 43 MG/DL (ref 8–23)
BUN/CREAT SERPL: 23.5 (ref 7–25)
CALCIUM SPEC-SCNC: 9 MG/DL (ref 8.6–10.5)
CHLORIDE SERPL-SCNC: 100 MMOL/L (ref 98–107)
CLARITY UR: CLEAR
CO2 SERPL-SCNC: 26.5 MMOL/L (ref 22–29)
COLOR UR: YELLOW
CREAT SERPL-MCNC: 1.83 MG/DL (ref 0.76–1.27)
DEPRECATED RDW RBC AUTO: 48.3 FL (ref 37–54)
EGFRCR SERPLBLD CKD-EPI 2021: 35.7 ML/MIN/1.73
EOSINOPHIL # BLD AUTO: 0.14 10*3/MM3 (ref 0–0.4)
EOSINOPHIL NFR BLD AUTO: 1.8 % (ref 0.3–6.2)
ERYTHROCYTE [DISTWIDTH] IN BLOOD BY AUTOMATED COUNT: 13.8 % (ref 12.3–15.4)
GLOBULIN UR ELPH-MCNC: 4.4 GM/DL
GLUCOSE SERPL-MCNC: 116 MG/DL (ref 65–99)
GLUCOSE UR STRIP-MCNC: NEGATIVE MG/DL
HCT VFR BLD AUTO: 30.9 % (ref 37.5–51)
HGB BLD-MCNC: 10 G/DL (ref 13–17.7)
HGB UR QL STRIP.AUTO: NEGATIVE
HOLD SPECIMEN: NORMAL
HOLD SPECIMEN: NORMAL
IMM GRANULOCYTES # BLD AUTO: 0.02 10*3/MM3 (ref 0–0.05)
IMM GRANULOCYTES NFR BLD AUTO: 0.3 % (ref 0–0.5)
KETONES UR QL STRIP: NEGATIVE
LEUKOCYTE ESTERASE UR QL STRIP.AUTO: NEGATIVE
LYMPHOCYTES # BLD AUTO: 2.06 10*3/MM3 (ref 0.7–3.1)
LYMPHOCYTES NFR BLD AUTO: 26.7 % (ref 19.6–45.3)
MAGNESIUM SERPL-MCNC: 1.5 MG/DL (ref 1.6–2.4)
MCH RBC QN AUTO: 31.2 PG (ref 26.6–33)
MCHC RBC AUTO-ENTMCNC: 32.4 G/DL (ref 31.5–35.7)
MCV RBC AUTO: 96.3 FL (ref 79–97)
MONOCYTES # BLD AUTO: 0.67 10*3/MM3 (ref 0.1–0.9)
MONOCYTES NFR BLD AUTO: 8.7 % (ref 5–12)
NEUTROPHILS NFR BLD AUTO: 4.78 10*3/MM3 (ref 1.7–7)
NEUTROPHILS NFR BLD AUTO: 62 % (ref 42.7–76)
NITRITE UR QL STRIP: NEGATIVE
NRBC BLD AUTO-RTO: 0 /100 WBC (ref 0–0.2)
PH UR STRIP.AUTO: 6 [PH] (ref 5–8)
PLATELET # BLD AUTO: 258 10*3/MM3 (ref 140–450)
PMV BLD AUTO: 9.1 FL (ref 6–12)
POTASSIUM SERPL-SCNC: 5 MMOL/L (ref 3.5–5.2)
PROT SERPL-MCNC: 7.3 G/DL (ref 6–8.5)
PROT UR QL STRIP: ABNORMAL
RBC # BLD AUTO: 3.21 10*6/MM3 (ref 4.14–5.8)
SODIUM SERPL-SCNC: 135 MMOL/L (ref 136–145)
SP GR UR STRIP: 1.01 (ref 1–1.03)
TROPONIN T SERPL HS-MCNC: 82 NG/L
TROPONIN T SERPL HS-MCNC: 84 NG/L
UROBILINOGEN UR QL STRIP: ABNORMAL
WBC NRBC COR # BLD: 7.71 10*3/MM3 (ref 3.4–10.8)
WHOLE BLOOD HOLD COAG: NORMAL
WHOLE BLOOD HOLD SPECIMEN: NORMAL

## 2023-03-06 PROCEDURE — 85025 COMPLETE CBC W/AUTO DIFF WBC: CPT

## 2023-03-06 PROCEDURE — 93010 ELECTROCARDIOGRAM REPORT: CPT | Performed by: INTERNAL MEDICINE

## 2023-03-06 PROCEDURE — 93005 ELECTROCARDIOGRAM TRACING: CPT | Performed by: EMERGENCY MEDICINE

## 2023-03-06 PROCEDURE — 84484 ASSAY OF TROPONIN QUANT: CPT

## 2023-03-06 PROCEDURE — 80053 COMPREHEN METABOLIC PANEL: CPT

## 2023-03-06 PROCEDURE — 36415 COLL VENOUS BLD VENIPUNCTURE: CPT

## 2023-03-06 PROCEDURE — 99284 EMERGENCY DEPT VISIT MOD MDM: CPT

## 2023-03-06 PROCEDURE — 81003 URINALYSIS AUTO W/O SCOPE: CPT | Performed by: EMERGENCY MEDICINE

## 2023-03-06 PROCEDURE — 84484 ASSAY OF TROPONIN QUANT: CPT | Performed by: EMERGENCY MEDICINE

## 2023-03-06 PROCEDURE — 83735 ASSAY OF MAGNESIUM: CPT

## 2023-03-06 PROCEDURE — 96374 THER/PROPH/DIAG INJ IV PUSH: CPT

## 2023-03-06 PROCEDURE — 25010000002 ONDANSETRON PER 1 MG: Performed by: EMERGENCY MEDICINE

## 2023-03-06 PROCEDURE — 93005 ELECTROCARDIOGRAM TRACING: CPT

## 2023-03-06 PROCEDURE — 71045 X-RAY EXAM CHEST 1 VIEW: CPT

## 2023-03-06 RX ORDER — SODIUM CHLORIDE 0.9 % (FLUSH) 0.9 %
10 SYRINGE (ML) INJECTION AS NEEDED
Status: DISCONTINUED | OUTPATIENT
Start: 2023-03-06 | End: 2023-03-07 | Stop reason: HOSPADM

## 2023-03-06 RX ORDER — ONDANSETRON 2 MG/ML
4 INJECTION INTRAMUSCULAR; INTRAVENOUS ONCE
Status: COMPLETED | OUTPATIENT
Start: 2023-03-06 | End: 2023-03-06

## 2023-03-06 RX ADMIN — ONDANSETRON 4 MG: 2 INJECTION INTRAMUSCULAR; INTRAVENOUS at 17:16

## 2023-03-06 RX ADMIN — METOPROLOL TARTRATE 12.5 MG: 25 TABLET, FILM COATED ORAL at 21:57

## 2023-03-06 NOTE — ED PROVIDER NOTES
Time: 5:48 PM EST  Date of encounter:  3/6/2023  Independent Historian/Clinical History and Information was obtained by:   Patient and Family  Chief Complaint: rapid heart rate    History is limited by: N/A    History of Present Illness:  Patient is a 87 y.o. year old male who presents to the emergency department for evaluation of rapid heart rate.     Pt family is at bedside. Pt felt like his heart was beating fast. Pt denies any chest pain. He denies any palpitations at the moment. He says he was not having chest pain with these episodes of palpitations.     Pt is not on Metoprolol and has not been on this medication since December due to low blood pressure. Pt family says that his blood pressure and heart rate have been low in the morning. He says he normally has readings of 80/40.     Pt has a history of A-Fib. Pt has a pacemaker.            Patient Care Team  Primary Care Provider: Basilio Diaz MD    Past Medical History:     Allergies   Allergen Reactions   • Pollen Extract Shortness Of Breath     Past Medical History:   Diagnosis Date   • A-fib (HCC)     ASYMPTOMATIC- SEE'S DR HASSAN- DENIED CP/SOB   • Arthritis    • Cancer (HCC)     SKIN CANCER LESION    • Carotid stenosis     STENOSIS OF LEFT CAROTID    • CKD (chronic kidney disease)     MONITORED BY PCP TWICE A YEAR   • CVA, old, aphasia 10/07/2021   • Hyperlipidemia    • Hypertension    • Pacemaker    • Sleep apnea     NO CPAP   • Stroke (HCC)     OCTOBER 7, 2021- WENT TO REHAB- DISCHARGED 11-1-21- GENERALIZED WEAKNESS      Past Surgical History:   Procedure Laterality Date   • CAROTID ENDARTERECTOMY Left 11/03/2021    Procedure: LEFT CAROTID ENDARTERECTOMY WITH SHUNT, BOVINE PATCH;  Surgeon: Olegario Hall MD;  Location: [facility] (formerly [facility]);  Service: Vascular;  Laterality: Left;   • EXCISION LESION      PRE CANCERIOUS LESION REMOVED   • PACEMAKER IMPLANTATION       History reviewed. No pertinent family history.    Home Medications:  Prior to  Admission medications    Medication Sig Start Date End Date Taking? Authorizing Provider   apixaban (ELIQUIS) 5 MG tablet tablet Take 1 tablet by mouth 2 (Two) Times a Day. PER DR HALL TO STOP 3 DAYS PRIOR TO SURGERY, PT REPORTED LAST DOSE 10-30-21   Yes Kinza El MD   aspirin 81 MG EC tablet Take 1 tablet by mouth Daily. 11/5/21 7/22/24 Yes Olegario Hall MD   atorvastatin (LIPITOR) 40 MG tablet Take 1 tablet by mouth Every Night.   Yes Kinza El MD   omeprazole (priLOSEC) 40 MG capsule Take 1 capsule by mouth Daily.   Yes Kinza El MD   vitamin B-12 (CYANOCOBALAMIN) 500 MCG tablet Take 1 tablet by mouth Daily.   Yes Kinza El MD   bacitracin-polymyxin b (POLYSPORIN) 500-03118 UNIT/GM ophthalmic ointment Apply small amount to wounds around left eye 2 times daily 12/17/21   Jaquelin Youngblood PA   ferrous sulfate 325 (65 FE) MG tablet Take 325 mg by mouth Daily With Breakfast.    Kinza El MD   fluticasone (FLONASE) 50 MCG/ACT nasal spray 1 spray into the nostril(s) as directed by provider Daily As Needed.    Kinza El MD   metoprolol succinate XL (TOPROL-XL) 25 MG 24 hr tablet Take 2 tablets by mouth 2 (Two) Times a Day.    ProviderKinza MD        Social History:   Social History     Tobacco Use   • Smoking status: Former     Types: Cigarettes   • Smokeless tobacco: Never   • Tobacco comments:     QUIT 40 YEARS AGO   Vaping Use   • Vaping Use: Never used   Substance Use Topics   • Alcohol use: Never   • Drug use: Never         Review of Systems:  Review of Systems   Constitutional: Negative for chills and fever.   HENT: Negative for sore throat.    Eyes: Negative for photophobia.   Respiratory: Negative for shortness of breath.    Cardiovascular: Positive for palpitations. Negative for chest pain.   Gastrointestinal: Negative for abdominal pain, diarrhea, nausea and vomiting.   Genitourinary: Negative for dysuria.   Musculoskeletal:  Negative for neck pain.   Skin: Negative for wound.   Neurological: Negative for headaches.   All other systems reviewed and are negative.       Physical Exam:  /80   Pulse 83   Temp 98.1 °F (36.7 °C) (Oral)   Resp 17   SpO2 99%     Physical Exam  Vitals and nursing note reviewed.   Constitutional:       General: He is not in acute distress.  HENT:      Head: Normocephalic and atraumatic.   Eyes:      Extraocular Movements: Extraocular movements intact.   Cardiovascular:      Rate and Rhythm: Normal rate. Rhythm irregularly irregular.   Pulmonary:      Effort: Pulmonary effort is normal. No respiratory distress.      Breath sounds: Normal breath sounds.   Abdominal:      General: Abdomen is flat.      Palpations: Abdomen is soft.      Tenderness: There is no abdominal tenderness.   Musculoskeletal:         General: Normal range of motion.      Cervical back: Normal range of motion and neck supple.      Right lower leg: No edema.      Left lower leg: No edema.   Skin:     General: Skin is warm and dry.      Capillary Refill: Capillary refill takes less than 2 seconds.   Neurological:      Mental Status: He is alert and oriented to person, place, and time. Mental status is at baseline.                  Procedures:  Procedures      Medical Decision Making:      Comorbidities that affect care:    Atrial Fibrillation, Cancer, Chronic Kidney Disease, Hypertension    External Notes reviewed:    None      The following orders were placed and all results were independently analyzed by me:  Orders Placed This Encounter   Procedures   • XR Chest 1 View   • Pe Ell Draw   • Comprehensive Metabolic Panel   • Magnesium   • Single High Sensitivity Troponin T   • CBC Auto Differential   • Urinalysis With Microscopic If Indicated (No Culture) - Urine, Clean Catch   • Single High Sensitivity Troponin T   • Undress & Gown   • Continuous Pulse Oximetry   • ECG 12 Lead ED Triage Standing Order; Dysrhythmia   • CBC &  Differential   • Green Top (Gel)   • Lavender Top   • Gold Top - SST   • Light Blue Top       Medications Given in the Emergency Department:  Medications   ondansetron (ZOFRAN) injection 4 mg (4 mg Intravenous Given 3/6/23 1716)   metoprolol tartrate (LOPRESSOR) tablet 12.5 mg (12.5 mg Oral Given 3/6/23 2157)        ED Course:         Labs:    Lab Results (last 24 hours)     ** No results found for the last 24 hours. **           Imaging:    No Radiology Exams Resulted Within Past 24 Hours      Differential Diagnosis and Discussion:    Palpitations: Differential diagnosis includes but is not limited to anxiety, atrioventricular blocks, mitral valve disease, hypoxia, coronary artery disease, hypokalemia, anemia, fever, COPD, congestive heart failure, pericarditis, Artemio-Parkinson-White syndrome, pulmonary embolism, SVT, atrial fibrillation, atrial flutter, sinus tachycardia, thyrotoxicosis, and pheochromocytoma.    All labs were reviewed and interpreted by me.  All X-rays were independently reviewed by me.  EKG was interpreted by me.    MDM   Patient is in atrial fibrillation but is rate controlled.  She was discussed with cardiology and is suitable for discharge with outpatient follow-up.      Patient Care Considerations:          Consultants/Shared Management Plan:    Consultant: I have discussed the case with Dr. Joaquin Chavez who states Patient can be discharged home with outpatient follow-up    Social Determinants of Health:    Patient has presented with family members who are responsible, reliable and will ensure follow up care.      Disposition and Care Coordination:    Discharged: The patient is suitable and stable for discharge with no need for consideration of observation or admission.        Final diagnoses:   Atrial fibrillation with rapid ventricular response (HCC)        ED Disposition     ED Disposition   Discharge    Condition   Stable    Comment   --             This medical record created using voice  recognition software.      Documentation assistance provided by Andres Cook acting as scribe for Russel Reddy DO. Information recorded by the scribe was done at my direction and has been verified and validated by me.        Andres Cook  03/06/23 1803       Russel Reddy DO  03/11/23 [id number]

## 2023-03-07 NOTE — DISCHARGE INSTRUCTIONS
Contact your cardiologist tomorrow morning to discuss continuing a low-dose metoprolol versus other medication for heart rate control

## 2023-03-29 LAB — QT INTERVAL: 340 MS

## 2023-05-14 PROBLEM — N40.1 BENIGN PROSTATIC HYPERPLASIA WITH LOWER URINARY TRACT SYMPTOMS: Status: ACTIVE | Noted: 2023-05-14

## 2023-05-15 NOTE — PROGRESS NOTES
Chief Complaint: Urinary Urgency and Urinary Incontinence    Subjective         History of Present Illness  Suhail Springer is a 87 y.o. male presents to [facility] UROLOGY to be seen for postvoid dribbling.    Patient was seen by his PCP on 4/13/2023 with loss of bladder control that has been worsening.  His family did request a referral to urology, he is here for further evaluation.    He did have a CVA 10/2021 with residual aphasia.    Frequency every 15-20 minutes but does not always go.     Incontinence new within the last 6 weeks.    Nocturia x 2-3    GH none     surgeries none     He only drinks 16-20 oz of fluid  Day.    No Family history of  malignancy    Cardiopulmonary: A-fib, CVA, hypertension, pacemaker    Anticoagulants: Eliquis, ASA 81 mg    Smoker: Quit 40 years ago  Objective     Past Medical History:   Diagnosis Date   • A-fib     ASYMPTOMATIC- SEE'S DR HASSAN- DENIED CP/SOB   • Arthritis    • Cancer     SKIN CANCER LESION    • Carotid stenosis     STENOSIS OF LEFT CAROTID    • CKD (chronic kidney disease)     MONITORED BY PCP TWICE A YEAR   • CVA, old, aphasia 10/07/2021   • Hyperlipidemia    • Hypertension    • Pacemaker    • Sleep apnea     NO CPAP   • Stroke     OCTOBER 7, 2021- WENT TO REHAB- DISCHARGED 11-1-21- GENERALIZED WEAKNESS        Past Surgical History:   Procedure Laterality Date   • CAROTID ENDARTERECTOMY Left 11/03/2021    Procedure: LEFT CAROTID ENDARTERECTOMY WITH SHUNT, BOVINE PATCH;  Surgeon: Olegario Hurst MD;  Location: [facility];  Service: Vascular;  Laterality: Left;   • EXCISION LESION      PRE CANCERIOUS LESION REMOVED   • PACEMAKER IMPLANTATION           Current Outpatient Medications:   •  apixaban (ELIQUIS) 5 MG tablet tablet, Take 1 tablet by mouth 2 (Two) Times a Day. PER DR HURST TO STOP 3 DAYS PRIOR TO SURGERY, PT REPORTED LAST DOSE 10-30-21, Disp: , Rfl:   •  atorvastatin (LIPITOR) 40 MG tablet, Take 1 tablet by mouth Every  "Night., Disp: , Rfl:   •  omeprazole (priLOSEC) 40 MG capsule, Take 1 capsule by mouth Daily., Disp: , Rfl:   •  aspirin 81 MG EC tablet, Take 1 tablet by mouth Daily. (Patient not taking: Reported on 5/16/2023), Disp: 90 tablet, Rfl: 10  •  terazosin (HYTRIN) 2 MG capsule, Take 1 capsule by mouth Every Night., Disp: 30 capsule, Rfl: 1    Allergies   Allergen Reactions   • Pollen Extract Shortness Of Breath        History reviewed. No pertinent family history.    Social History     Socioeconomic History   • Marital status:    Tobacco Use   • Smoking status: Former     Types: Cigarettes     Passive exposure: Past   • Smokeless tobacco: Never   • Tobacco comments:     QUIT 40 YEARS AGO   Vaping Use   • Vaping Use: Never used   Substance and Sexual Activity   • Alcohol use: Never   • Drug use: Never   • Sexual activity: Defer       Vital Signs:   Resp 14   Ht 177.8 cm (70\")   Wt 61.2 kg (135 lb)   BMI 19.37 kg/m²           Result Review :   The following data was reviewed by: ORQUIDEA Collier on 05/16/2023:       Bladder Scan interpretation 05/16/2023    Estimation of residual urine via BVI 3000 [place]athon Bladder Scan  MA/nurse performing: Anna WREN MA  Residual Urine: 130 ml  Indication: Urinary urgency    Urinary incontinence, unspecified type    Urinary frequency   Position: Supine  Examination: Incremental scanning of the suprapubic area using 2.0 MHz transducer using copious amounts of acoustic gel.   Findings: An anechoic area was demonstrated which represented the bladder, with measurement of residual urine as noted. I inspected this myself. In that the residual urine was  insignificant, refer to plan for treatment and plan necessary at this time.           Procedures        Assessment and Plan    Diagnoses and all orders for this visit:    1. Urinary urgency (Primary)  -     Bladder Scan  -     terazosin (HYTRIN) 2 MG capsule; Take 1 capsule by mouth Every Night.  Dispense: 30 capsule; Refill: " 1  -     Urinalysis With Microscopic - Urine, Clean Catch; Future  -     Urine Culture - Urine, Urine, Catheter In/Out; Future    2. Urinary incontinence, unspecified type  -     Urinalysis With Microscopic - Urine, Clean Catch; Future  -     Urine Culture - Urine, Urine, Catheter In/Out; Future    3. Urinary frequency      We will start him on hytrin to see if this will help his incontinence and urinary frequency.      We did discuss with his son at this point in time that his urinary frequency also could be related to hydration status as dehydration will lead to concentrated urine and irritate the bladder thus causing more urgency and frequency    We we will place an order for urine culture to be obtained via catheterized specimen with home health.    Did discuss with patient and patient's son that should he have failure to improve with terazosin may need to consider urodynamics testing to rule out bladder dysfunction secondary to CVA      Follow Up   Return in about 4 weeks (around 6/13/2023) for f/u in [place] with pvr check .  Patient was given instructions and counseling regarding his condition or for health maintenance advice. Please see specific information pulled into the AVS if appropriate.         This document has been electronically signed by ORQUIDEA Collier  May 16, 2023 16:23 EDT

## 2023-05-16 ENCOUNTER — OFFICE VISIT (OUTPATIENT)
Dept: UROLOGY | Facility: CLINIC | Age: 88
End: 2023-05-16
Payer: MEDICARE

## 2023-05-16 VITALS — RESPIRATION RATE: 14 BRPM | HEIGHT: 70 IN | WEIGHT: 135 LBS | BODY MASS INDEX: 19.33 KG/M2

## 2023-05-16 DIAGNOSIS — R39.15 URINARY URGENCY: Primary | ICD-10-CM

## 2023-05-16 DIAGNOSIS — R35.0 URINARY FREQUENCY: ICD-10-CM

## 2023-05-16 DIAGNOSIS — R32 URINARY INCONTINENCE, UNSPECIFIED TYPE: ICD-10-CM

## 2023-05-16 PROBLEM — K25.9 GASTRIC ULCER: Status: ACTIVE | Noted: 2023-04-13

## 2023-05-16 PROBLEM — N28.9 KIDNEY DISORDER: Status: ACTIVE | Noted: 2022-12-06

## 2023-05-16 PROBLEM — Z74.09 IMPAIRED MOBILITY: Status: ACTIVE | Noted: 2023-05-16

## 2023-05-16 PROBLEM — I48.91 ATRIAL FIBRILLATION: Status: ACTIVE | Noted: 2017-04-28

## 2023-05-16 PROBLEM — G89.29 CHRONIC NECK PAIN: Status: ACTIVE | Noted: 2021-08-17

## 2023-05-16 PROBLEM — J84.10 INTERSTITIAL PULMONARY FIBROSIS: Status: ACTIVE | Noted: 2022-12-06

## 2023-05-16 PROBLEM — M79.609 PAIN IN LIMB: Status: ACTIVE | Noted: 2023-05-16

## 2023-05-16 PROBLEM — H43.813 VITREOUS DEGENERATION OF BOTH EYES: Status: ACTIVE | Noted: 2021-07-19

## 2023-05-16 PROBLEM — M70.20 OLECRANON BURSITIS: Status: ACTIVE | Noted: 2019-06-14

## 2023-05-16 PROBLEM — M50.30 DDD (DEGENERATIVE DISC DISEASE), CERVICAL: Status: ACTIVE | Noted: 2021-09-28

## 2023-05-16 PROBLEM — R47.01 APHASIA: Status: ACTIVE | Noted: 2023-05-16

## 2023-05-16 PROBLEM — H26.9 CATARACT: Status: ACTIVE | Noted: 2023-04-13

## 2023-05-16 PROBLEM — Z91.89 AT RISK FOR VENOUS THROMBOEMBOLISM (VTE): Status: ACTIVE | Noted: 2021-10-15

## 2023-05-16 PROBLEM — R20.2 HAND PARESTHESIA: Status: ACTIVE | Noted: 2021-08-17

## 2023-05-16 PROBLEM — I49.5 TACHYCARDIA-BRADYCARDIA: Status: ACTIVE | Noted: 2018-03-28

## 2023-05-16 PROBLEM — Z98.890 H/O SPINAL SURGERY: Status: ACTIVE | Noted: 2018-03-28

## 2023-05-16 PROBLEM — M54.2 CHRONIC NECK PAIN: Status: ACTIVE | Noted: 2021-08-17

## 2023-05-16 PROBLEM — N18.9 ACUTE-ON-CHRONIC RENAL FAILURE: Status: ACTIVE | Noted: 2017-04-10

## 2023-05-16 PROBLEM — K21.9 GASTROESOPHAGEAL REFLUX DISEASE: Status: ACTIVE | Noted: 2017-04-18

## 2023-05-16 PROBLEM — D64.9 ANEMIA: Status: ACTIVE | Noted: 2019-12-16

## 2023-05-16 PROBLEM — G62.9 NEUROPATHY: Status: ACTIVE | Noted: 2022-12-06

## 2023-05-16 PROBLEM — N17.9 ACUTE-ON-CHRONIC RENAL FAILURE: Status: ACTIVE | Noted: 2017-04-10

## 2023-05-16 PROBLEM — L60.1 ONYCHOLYSIS: Status: ACTIVE | Noted: 2023-05-16

## 2023-05-16 PROBLEM — Z95.0 PRESENCE OF CARDIAC PACEMAKER: Status: ACTIVE | Noted: 2018-04-04

## 2023-05-16 PROBLEM — R00.0 SINUS TACHYCARDIA: Status: ACTIVE | Noted: 2017-04-10

## 2023-05-16 PROBLEM — I25.10 ATHEROSCLEROSIS OF CORONARY ARTERY: Status: ACTIVE | Noted: 2017-04-06

## 2023-05-16 PROBLEM — Z96.1 BILATERAL PSEUDOPHAKIA: Status: ACTIVE | Noted: 2021-07-19

## 2023-05-16 PROBLEM — G45.3 AMAUROSIS FUGAX: Status: ACTIVE | Noted: 2017-04-18

## 2023-05-16 LAB — URINE VOLUME: 130

## 2023-05-16 RX ORDER — TERAZOSIN 2 MG/1
2 CAPSULE ORAL NIGHTLY
Qty: 30 CAPSULE | Refills: 1 | Status: SHIPPED | OUTPATIENT
Start: 2023-05-16

## 2023-07-25 ENCOUNTER — TELEPHONE (OUTPATIENT)
Dept: UROLOGY | Facility: CLINIC | Age: 88
End: 2023-07-25
Payer: MEDICARE

## (undated) DEVICE — Device

## (undated) DEVICE — GLV SURG SENSICARE PI PF LF 7 GRN STRL

## (undated) DEVICE — SUT SILK 2/0 TIES 18IN A185H

## (undated) DEVICE — LP VESL MINI RED 2PK

## (undated) DEVICE — SUT PROLN 6/0 BV1 D/A 30IN 8709H

## (undated) DEVICE — SUT SILK 3/0 TIES 18IN A184H

## (undated) DEVICE — INTENDED FOR TISSUE SEPARATION, AND OTHER PROCEDURES THAT REQUIRE A SHARP SURGICAL BLADE TO PUNCTURE OR CUT.: Brand: BARD-PARKER ® STAINLESS STEEL BLADES

## (undated) DEVICE — SOL NS 500ML

## (undated) DEVICE — STERILE POLYISOPRENE POWDER-FREE SURGICAL GLOVES: Brand: PROTEXIS

## (undated) DEVICE — NECK VASCULAR-LF: Brand: MEDLINE INDUSTRIES, INC.

## (undated) DEVICE — SUT PROLN 5/0 C1 D/A 36IN 8720H

## (undated) DEVICE — 9F PRUITT F3 OUTLYING SHUNT WITH T-PORT: Brand: PRUITT F3 CAROTID SHUNT

## (undated) DEVICE — GLV SURG SENSICARE PI LF PF 7.5 GRN STRL

## (undated) DEVICE — ADHS SKIN PREMIERPRO EXOFIN TOPICAL HI/VISC .5ML

## (undated) DEVICE — ANTIBACTERIAL UNDYED BRAIDED (POLYGLACTIN 910), SYNTHETIC ABSORBABLE SUTURE: Brand: COATED VICRYL

## (undated) DEVICE — SOL IRR NACL 0.9PCT BT 1000ML

## (undated) DEVICE — SUT MNCRYL PLS ANTIB UD 4/0 PS2 18IN